# Patient Record
Sex: FEMALE | Race: WHITE | Employment: OTHER | URBAN - NONMETROPOLITAN AREA
[De-identification: names, ages, dates, MRNs, and addresses within clinical notes are randomized per-mention and may not be internally consistent; named-entity substitution may affect disease eponyms.]

---

## 2018-01-06 PROBLEM — C50.211: Status: ACTIVE | Noted: 2018-01-06

## 2018-01-06 NOTE — PROGRESS NOTES
Paula Veliz. Richi Patton MD [facility]  General Surgery  Postprocedure Evaluation in Office  Pt Name: Tomeka Hernandez  Date of Birth 1950   Today's Date: 1/8/2018  Medical Record Number: [id number]  Referring Provider: No ref. provider found  Primary Care Provider: Pito Parekh MD  Chief Complaint   Patient presents with   [facility]     established pt--right breast cancer     ASSESSMENT      Problem List Items Addressed This Visit     Personal history of breast cancer    History of left mastectomy - Primary    Relevant Orders    External Referral To Plastic Surgery    Carcinoma of upper-inner quadrant of female breast, right [facility])    Relevant Orders    External Referral To Plastic Surgery    MASTECTOMY COMPLETE / SIMPLE    LYMPHANGIOGRAPHY INJECTION FOR SENTINEL NODE IDENTIFICATION    BIOPSY / EXCISION SENTINEL NODE DEEP AXILLARY      Other Visit Diagnoses    None. Carcinoma of upper-inner quadrant of female breast, right [facility])    Staging form: Breast, AJCC 8th Edition    - Clinical stage from 1/6/2018: Stage IA (cT1b, cN0, cM0, G3, ER: Positive, VA: Positive, HER2: Negative) - Signed by Mateo Almonte MD on 1/6/2018       PLANS       There are no Patient Instructions on file for this visit. 1. Follow up: No Follow-up on file.   2. Will see Dr Tyesha Donaldson after surgery  Orders Placed This Encounter:  Orders Placed This Encounter   Procedures    MASTECTOMY COMPLETE / SIMPLE     Standing Status:   Future     Standing Expiration Date:   1/8/2019     Order Specific Question:   Pre-procedure Diagnosis     Answer:   RIGHT BREAST CANCER    LYMPHANGIOGRAPHY INJECTION FOR SENTINEL NODE IDENTIFICATION     Standing Status:   Future     Standing Expiration Date:   1/8/2019     Order Specific Question:   Pre-procedure Diagnosis     Answer:   Breast Cancer     Order Specific Question:   Special needs     Answer:   Dr Richi Patton to Perform    BIOPSY / EXCISION SENTINEL NODE DEEP AXILLARY     Standing Status:   Future Procedure Laterality Date    APPENDECTOMY  as a child    3years old    BREAST BIOPSY Right 12/28/2017    [address]  [address]    Dr Janes Abreu  2006    reconstruction breast -Dr Jim Toussaint Right 2005    Dr. Rico Fritz Left 2006    Dr Lindsay Trammell     Medications  Current Outpatient Prescriptions on File Prior to Visit   Medication Sig Dispense Refill    venlafaxine (EFFEXOR) 75 MG tablet Take 75 mg by mouth daily      levothyroxine (SYNTHROID) 75 MCG tablet Take 75 mcg by mouth Daily       No current facility-administered medications on file prior to visit. Allergies  No Known Allergies  Social History  Social History     Social History    Marital status:      Spouse name: N/A    Number of children: 2    Years of education: N/A     Occupational History    Not on file. Social History Main Topics    Smoking status: Never Smoker    Smokeless tobacco: Never Used    Alcohol use 0.0 oz/week      Comment: social    Drug use: No    Sexual activity: Not on file     Other Topics Concern    Not on file     Social History Narrative    No narrative on file     [address] Maintenance   Topic Date Due    Hepatitis C screen  1950    DTaP/Tdap/Td vaccine (1 - Tdap) 08/10/1967    Lipid screen  08/10/1988    Diabetes screen  08/10/1988    Colon cancer screen colonoscopy  08/10/1998    Zostavax vaccine  08/10/2008    DEXA (modify frequency per FRAX score)  08/10/2013    Pneumococcal low/med risk (1 of 2 - PCV13) 08/10/2013    Flu vaccine (1) 09/01/2017    Breast cancer screen  12/08/2017     Review of Systems  History obtained from the patient. Constitutional: Negative for fever, chills, diaphoresis, activity change, appetite change, fatigue and unexpected weight change.    HENT: Negative for congestion, dental problem, drooling, ear discharge, ear pain, facial swelling, hearing loss, breast has a left mastectomy with reconstruction in place. No sign recurrence. . Right breast is examined in the upright and supine position  . The right breast has a circumareolar and inframammary scar from previous breast reduction. There is a biopsy site located at the 3:00 position near the nipple areolar complex with some ecchymosis of the skin more medial to this. Appreciate any mass in this area. Axilla is clinically negative. Brittanie Gonzalez Physical Examination: Eyes - pupils equal and reactive, extraocular eye movements intact  Ears - bilateral TM's and external ear canals normal  Nose - normal and patent, no erythema, discharge or polyps  Mouth - mucous membranes moist, pharynx normal without lesions  Chest - clear to auscultation, no wheezes, rales or rhonchi, symmetric air entry  Heart - normal rate, regular rhythm, normal S1, S2, no murmurs, rubs, clicks or gallops  Abdomen - soft, nontender, nondistended, no masses or organomegaly  Rectal -deferred  Extremities - peripheral pulses normal, no pedal edema, no clubbing or cyanosis             Vanna Sullivan MD [facility]  1/8/2018   The patients records and films were reviewed independently. Time was given for questions . All questions were answered to the patients satisfaction. A total time of 60 minutes  was spent with at least 51% related to counseling and coordination of care.

## 2018-01-08 ENCOUNTER — OFFICE VISIT (OUTPATIENT)
Dept: SURGERY | Age: 68
End: 2018-01-08
Payer: MEDICARE

## 2018-01-08 ENCOUNTER — TELEPHONE (OUTPATIENT)
Dept: SURGERY | Age: 68
End: 2018-01-08

## 2018-01-08 VITALS
DIASTOLIC BLOOD PRESSURE: 62 MMHG | TEMPERATURE: 97 F | SYSTOLIC BLOOD PRESSURE: 130 MMHG | BODY MASS INDEX: 28.44 KG/M2 | RESPIRATION RATE: 18 BRPM | HEART RATE: 90 BPM | HEIGHT: 63 IN | OXYGEN SATURATION: 97 % | WEIGHT: 160.5 LBS

## 2018-01-08 DIAGNOSIS — Z90.12 HISTORY OF LEFT MASTECTOMY: Primary | ICD-10-CM

## 2018-01-08 DIAGNOSIS — C50.211 CARCINOMA OF UPPER-INNER QUADRANT OF FEMALE BREAST, RIGHT (HCC): ICD-10-CM

## 2018-01-08 DIAGNOSIS — Z85.3 PERSONAL HISTORY OF BREAST CANCER: ICD-10-CM

## 2018-01-08 PROCEDURE — G8400 PT W/DXA NO RESULTS DOC: HCPCS | Performed by: SURGERY

## 2018-01-08 PROCEDURE — 1123F ACP DISCUSS/DSCN MKR DOCD: CPT | Performed by: SURGERY

## 2018-01-08 PROCEDURE — G8427 DOCREV CUR MEDS BY ELIG CLIN: HCPCS | Performed by: SURGERY

## 2018-01-08 PROCEDURE — 1090F PRES/ABSN URINE INCON ASSESS: CPT | Performed by: SURGERY

## 2018-01-08 PROCEDURE — G8484 FLU IMMUNIZE NO ADMIN: HCPCS | Performed by: SURGERY

## 2018-01-08 PROCEDURE — 99215 OFFICE O/P EST HI 40 MIN: CPT | Performed by: SURGERY

## 2018-01-08 PROCEDURE — 4040F PNEUMOC VAC/ADMIN/RCVD: CPT | Performed by: SURGERY

## 2018-01-08 PROCEDURE — G8419 CALC BMI OUT NRM PARAM NOF/U: HCPCS | Performed by: SURGERY

## 2018-01-08 PROCEDURE — 3014F SCREEN MAMMO DOC REV: CPT | Performed by: SURGERY

## 2018-01-08 PROCEDURE — 3017F COLORECTAL CA SCREEN DOC REV: CPT | Performed by: SURGERY

## 2018-01-08 PROCEDURE — 1036F TOBACCO NON-USER: CPT | Performed by: SURGERY

## 2018-01-08 ASSESSMENT — ENCOUNTER SYMPTOMS
CHOKING: 0
ABDOMINAL PAIN: 0
CONSTIPATION: 0
EYE DISCHARGE: 0
COLOR CHANGE: 0
CHEST TIGHTNESS: 0
DIARRHEA: 0
STRIDOR: 0
VOICE CHANGE: 0
BACK PAIN: 0
APNEA: 0
COUGH: 0
VOMITING: 0
BLOOD IN STOOL: 0
FACIAL SWELLING: 0
WHEEZING: 0
SINUS PAIN: 0
EYE REDNESS: 0
EYE ITCHING: 0
ANAL BLEEDING: 0
SHORTNESS OF BREATH: 0
ABDOMINAL DISTENTION: 0
RHINORRHEA: 0
NAUSEA: 0
RECTAL PAIN: 0
SORE THROAT: 0
TROUBLE SWALLOWING: 0
SINUS PRESSURE: 0
PHOTOPHOBIA: 0
EYE PAIN: 0

## 2018-01-11 ENCOUNTER — TELEPHONE (OUTPATIENT)
Dept: SURGERY | Age: 68
End: 2018-01-11

## 2018-01-18 RX ORDER — UBIDECARENONE 30 MG
1 CAPSULE ORAL DAILY
COMMUNITY

## 2018-01-18 RX ORDER — CHOLECALCIFEROL (VITAMIN D3) 125 MCG
1 CAPSULE ORAL DAILY
COMMUNITY

## 2018-01-25 ENCOUNTER — ANESTHESIA (OUTPATIENT)
Dept: OPERATING ROOM | Age: 68
End: 2018-01-25
Payer: MEDICARE

## 2018-01-25 ENCOUNTER — HOSPITAL ENCOUNTER (OUTPATIENT)
Dept: NUCLEAR MEDICINE | Age: 68
Discharge: HOME | End: 2018-01-25
Payer: MEDICARE

## 2018-01-25 ENCOUNTER — ANESTHESIA EVENT (OUTPATIENT)
Dept: OPERATING ROOM | Age: 68
End: 2018-01-25
Payer: MEDICARE

## 2018-01-25 ENCOUNTER — HOSPITAL ENCOUNTER (OUTPATIENT)
Age: 68
Discharge: HOME | End: 2018-01-27
Attending: SURGERY | Admitting: SURGERY
Payer: MEDICARE

## 2018-01-25 VITALS — OXYGEN SATURATION: 97 % | DIASTOLIC BLOOD PRESSURE: 78 MMHG | SYSTOLIC BLOOD PRESSURE: 149 MMHG | TEMPERATURE: 97.2 F

## 2018-01-25 DIAGNOSIS — Z90.11 S/P MASTECTOMY, RIGHT: Primary | ICD-10-CM

## 2018-01-25 DIAGNOSIS — C50.211 CARCINOMA OF UPPER-INNER QUADRANT OF FEMALE BREAST, RIGHT (HCC): ICD-10-CM

## 2018-01-25 PROCEDURE — 3600000003 HC SURGERY LEVEL 3 BASE: Performed by: SURGERY

## 2018-01-25 PROCEDURE — A6252 ABSORPT DRG >16 <=48 W/O BDR: HCPCS | Performed by: SURGERY

## 2018-01-25 PROCEDURE — A9541 TC99M SULFUR COLLOID: HCPCS | Performed by: SURGERY

## 2018-01-25 PROCEDURE — 88307 TISSUE EXAM BY PATHOLOGIST: CPT

## 2018-01-25 PROCEDURE — A6223 GAUZE >16<=48 NO W/SAL W/O B: HCPCS | Performed by: SURGERY

## 2018-01-25 PROCEDURE — 3430000000 HC RX DIAGNOSTIC RADIOPHARMACEUTICAL: Performed by: SURGERY

## 2018-01-25 PROCEDURE — 7100000000 HC PACU RECOVERY - FIRST 15 MIN: Performed by: SURGERY

## 2018-01-25 PROCEDURE — 38792 RA TRACER ID OF SENTINL NODE: CPT | Performed by: SURGERY

## 2018-01-25 PROCEDURE — 6360000002 HC RX W HCPCS: Performed by: ANESTHESIOLOGY

## 2018-01-25 PROCEDURE — 3700000000 HC ANESTHESIA ATTENDED CARE: Performed by: SURGERY

## 2018-01-25 PROCEDURE — 38792 RA TRACER ID OF SENTINL NODE: CPT

## 2018-01-25 PROCEDURE — 3600000013 HC SURGERY LEVEL 3 ADDTL 15MIN: Performed by: SURGERY

## 2018-01-25 PROCEDURE — 6370000000 HC RX 637 (ALT 250 FOR IP): Performed by: SURGERY

## 2018-01-25 PROCEDURE — 2500000003 HC RX 250 WO HCPCS: Performed by: NURSE ANESTHETIST, CERTIFIED REGISTERED

## 2018-01-25 PROCEDURE — 88342 IMHCHEM/IMCYTCHM 1ST ANTB: CPT

## 2018-01-25 PROCEDURE — 96361 HYDRATE IV INFUSION ADD-ON: CPT

## 2018-01-25 PROCEDURE — 6360000002 HC RX W HCPCS: Performed by: NURSE ANESTHETIST, CERTIFIED REGISTERED

## 2018-01-25 PROCEDURE — 88341 IMHCHEM/IMCYTCHM EA ADD ANTB: CPT

## 2018-01-25 PROCEDURE — A4648 IMPLANTABLE TISSUE MARKER: HCPCS | Performed by: SURGERY

## 2018-01-25 PROCEDURE — 7100000010 HC PHASE II RECOVERY - FIRST 15 MIN: Performed by: SURGERY

## 2018-01-25 PROCEDURE — 96360 HYDRATION IV INFUSION INIT: CPT

## 2018-01-25 PROCEDURE — 19303 MAST SIMPLE COMPLETE: CPT | Performed by: SURGERY

## 2018-01-25 PROCEDURE — 6360000002 HC RX W HCPCS: Performed by: SURGERY

## 2018-01-25 PROCEDURE — 2580000003 HC RX 258: Performed by: SURGERY

## 2018-01-25 PROCEDURE — 38525 BIOPSY/REMOVAL LYMPH NODES: CPT | Performed by: SURGERY

## 2018-01-25 PROCEDURE — 88304 TISSUE EXAM BY PATHOLOGIST: CPT

## 2018-01-25 PROCEDURE — 7100000011 HC PHASE II RECOVERY - ADDTL 15 MIN: Performed by: SURGERY

## 2018-01-25 PROCEDURE — A6446 CONFORM BAND S W>=3" <5"/YD: HCPCS | Performed by: SURGERY

## 2018-01-25 PROCEDURE — 2780000010 HC IMPLANT OTHER: Performed by: SURGERY

## 2018-01-25 PROCEDURE — 7100000001 HC PACU RECOVERY - ADDTL 15 MIN: Performed by: SURGERY

## 2018-01-25 PROCEDURE — 3700000001 HC ADD 15 MINUTES (ANESTHESIA): Performed by: SURGERY

## 2018-01-25 RX ORDER — VENLAFAXINE 37.5 MG/1
75 TABLET ORAL DAILY
Status: DISCONTINUED | OUTPATIENT
Start: 2018-01-25 | End: 2018-01-27 | Stop reason: HOSPADM

## 2018-01-25 RX ORDER — ONDANSETRON 2 MG/ML
4 INJECTION INTRAMUSCULAR; INTRAVENOUS
Status: DISCONTINUED | OUTPATIENT
Start: 2018-01-25 | End: 2018-01-25 | Stop reason: HOSPADM

## 2018-01-25 RX ORDER — SUCCINYLCHOLINE CHLORIDE 20 MG/ML
INJECTION INTRAMUSCULAR; INTRAVENOUS PRN
Status: DISCONTINUED | OUTPATIENT
Start: 2018-01-25 | End: 2018-01-25 | Stop reason: SDUPTHER

## 2018-01-25 RX ORDER — PROPOFOL 10 MG/ML
INJECTION, EMULSION INTRAVENOUS PRN
Status: DISCONTINUED | OUTPATIENT
Start: 2018-01-25 | End: 2018-01-25 | Stop reason: SDUPTHER

## 2018-01-25 RX ORDER — DIPHENHYDRAMINE HYDROCHLORIDE 50 MG/ML
12.5 INJECTION INTRAMUSCULAR; INTRAVENOUS
Status: DISCONTINUED | OUTPATIENT
Start: 2018-01-25 | End: 2018-01-25 | Stop reason: HOSPADM

## 2018-01-25 RX ORDER — HYDROMORPHONE HCL 110MG/55ML
PATIENT CONTROLLED ANALGESIA SYRINGE INTRAVENOUS PRN
Status: DISCONTINUED | OUTPATIENT
Start: 2018-01-25 | End: 2018-01-25 | Stop reason: SDUPTHER

## 2018-01-25 RX ORDER — SODIUM CHLORIDE 0.9 % (FLUSH) 0.9 %
10 SYRINGE (ML) INJECTION EVERY 12 HOURS SCHEDULED
Status: DISCONTINUED | OUTPATIENT
Start: 2018-01-25 | End: 2018-01-25 | Stop reason: HOSPADM

## 2018-01-25 RX ORDER — ACETAMINOPHEN 325 MG/1
650 TABLET ORAL EVERY 4 HOURS PRN
Status: DISCONTINUED | OUTPATIENT
Start: 2018-01-25 | End: 2018-01-27 | Stop reason: HOSPADM

## 2018-01-25 RX ORDER — MORPHINE SULFATE 2 MG/ML
2 INJECTION, SOLUTION INTRAMUSCULAR; INTRAVENOUS
Status: ACTIVE | OUTPATIENT
Start: 2018-01-25 | End: 2018-01-26

## 2018-01-25 RX ORDER — HYDROCODONE BITARTRATE AND ACETAMINOPHEN 5; 325 MG/1; MG/1
1 TABLET ORAL EVERY 4 HOURS PRN
Status: DISCONTINUED | OUTPATIENT
Start: 2018-01-25 | End: 2018-01-27 | Stop reason: HOSPADM

## 2018-01-25 RX ORDER — HYDROCODONE BITARTRATE AND ACETAMINOPHEN 5; 325 MG/1; MG/1
2 TABLET ORAL EVERY 4 HOURS PRN
Status: DISCONTINUED | OUTPATIENT
Start: 2018-01-25 | End: 2018-01-27 | Stop reason: HOSPADM

## 2018-01-25 RX ORDER — ONDANSETRON 2 MG/ML
INJECTION INTRAMUSCULAR; INTRAVENOUS PRN
Status: DISCONTINUED | OUTPATIENT
Start: 2018-01-25 | End: 2018-01-25 | Stop reason: SDUPTHER

## 2018-01-25 RX ORDER — MORPHINE SULFATE 2 MG/ML
2 INJECTION, SOLUTION INTRAMUSCULAR; INTRAVENOUS EVERY 5 MIN PRN
Status: DISCONTINUED | OUTPATIENT
Start: 2018-01-25 | End: 2018-01-25 | Stop reason: HOSPADM

## 2018-01-25 RX ORDER — MIDAZOLAM HYDROCHLORIDE 1 MG/ML
INJECTION INTRAMUSCULAR; INTRAVENOUS PRN
Status: DISCONTINUED | OUTPATIENT
Start: 2018-01-25 | End: 2018-01-25 | Stop reason: SDUPTHER

## 2018-01-25 RX ORDER — LIDOCAINE HYDROCHLORIDE 20 MG/ML
INJECTION, SOLUTION INFILTRATION; PERINEURAL PRN
Status: DISCONTINUED | OUTPATIENT
Start: 2018-01-25 | End: 2018-01-25 | Stop reason: SDUPTHER

## 2018-01-25 RX ORDER — HYDRALAZINE HYDROCHLORIDE 20 MG/ML
5 INJECTION INTRAMUSCULAR; INTRAVENOUS EVERY 10 MIN PRN
Status: DISCONTINUED | OUTPATIENT
Start: 2018-01-25 | End: 2018-01-25 | Stop reason: HOSPADM

## 2018-01-25 RX ORDER — SODIUM CHLORIDE 0.9 % (FLUSH) 0.9 %
10 SYRINGE (ML) INJECTION EVERY 12 HOURS SCHEDULED
Status: DISCONTINUED | OUTPATIENT
Start: 2018-01-25 | End: 2018-01-27 | Stop reason: HOSPADM

## 2018-01-25 RX ORDER — SODIUM CHLORIDE 0.9 % (FLUSH) 0.9 %
10 SYRINGE (ML) INJECTION PRN
Status: DISCONTINUED | OUTPATIENT
Start: 2018-01-25 | End: 2018-01-27 | Stop reason: HOSPADM

## 2018-01-25 RX ORDER — FENTANYL CITRATE 50 UG/ML
INJECTION, SOLUTION INTRAMUSCULAR; INTRAVENOUS PRN
Status: DISCONTINUED | OUTPATIENT
Start: 2018-01-25 | End: 2018-01-25 | Stop reason: SDUPTHER

## 2018-01-25 RX ORDER — SODIUM CHLORIDE, SODIUM LACTATE, POTASSIUM CHLORIDE, CALCIUM CHLORIDE 600; 310; 30; 20 MG/100ML; MG/100ML; MG/100ML; MG/100ML
INJECTION, SOLUTION INTRAVENOUS CONTINUOUS
Status: DISCONTINUED | OUTPATIENT
Start: 2018-01-25 | End: 2018-01-27 | Stop reason: HOSPADM

## 2018-01-25 RX ORDER — MORPHINE SULFATE 2 MG/ML
4 INJECTION, SOLUTION INTRAMUSCULAR; INTRAVENOUS
Status: ACTIVE | OUTPATIENT
Start: 2018-01-25 | End: 2018-01-26

## 2018-01-25 RX ORDER — ONDANSETRON 2 MG/ML
4 INJECTION INTRAMUSCULAR; INTRAVENOUS EVERY 6 HOURS PRN
Status: DISCONTINUED | OUTPATIENT
Start: 2018-01-25 | End: 2018-01-27 | Stop reason: HOSPADM

## 2018-01-25 RX ORDER — SODIUM CHLORIDE 0.9 % (FLUSH) 0.9 %
10 SYRINGE (ML) INJECTION PRN
Status: DISCONTINUED | OUTPATIENT
Start: 2018-01-25 | End: 2018-01-25 | Stop reason: HOSPADM

## 2018-01-25 RX ORDER — LEVOTHYROXINE SODIUM 0.07 MG/1
75 TABLET ORAL DAILY
Status: DISCONTINUED | OUTPATIENT
Start: 2018-01-25 | End: 2018-01-27 | Stop reason: HOSPADM

## 2018-01-25 RX ORDER — SODIUM CHLORIDE 9 MG/ML
INJECTION, SOLUTION INTRAVENOUS CONTINUOUS
Status: DISCONTINUED | OUTPATIENT
Start: 2018-01-25 | End: 2018-01-25

## 2018-01-25 RX ORDER — DEXAMETHASONE SODIUM PHOSPHATE 4 MG/ML
INJECTION, SOLUTION INTRA-ARTICULAR; INTRALESIONAL; INTRAMUSCULAR; INTRAVENOUS; SOFT TISSUE PRN
Status: DISCONTINUED | OUTPATIENT
Start: 2018-01-25 | End: 2018-01-25 | Stop reason: SDUPTHER

## 2018-01-25 RX ORDER — LABETALOL HYDROCHLORIDE 5 MG/ML
5 INJECTION, SOLUTION INTRAVENOUS EVERY 5 MIN PRN
Status: DISCONTINUED | OUTPATIENT
Start: 2018-01-25 | End: 2018-01-25 | Stop reason: HOSPADM

## 2018-01-25 RX ORDER — FENTANYL CITRATE 50 UG/ML
50 INJECTION, SOLUTION INTRAMUSCULAR; INTRAVENOUS EVERY 5 MIN PRN
Status: DISCONTINUED | OUTPATIENT
Start: 2018-01-25 | End: 2018-01-25 | Stop reason: HOSPADM

## 2018-01-25 RX ORDER — MEPERIDINE HYDROCHLORIDE 25 MG/ML
12.5 INJECTION INTRAMUSCULAR; INTRAVENOUS; SUBCUTANEOUS EVERY 5 MIN PRN
Status: DISCONTINUED | OUTPATIENT
Start: 2018-01-25 | End: 2018-01-25 | Stop reason: HOSPADM

## 2018-01-25 RX ADMIN — PHENYLEPHRINE HYDROCHLORIDE 100 MCG: 10 INJECTION INTRAMUSCULAR; INTRAVENOUS; SUBCUTANEOUS at 13:07

## 2018-01-25 RX ADMIN — FENTANYL CITRATE 50 MCG: 50 INJECTION INTRAMUSCULAR; INTRAVENOUS at 14:51

## 2018-01-25 RX ADMIN — CEFAZOLIN SODIUM 2 G: 2 SOLUTION INTRAVENOUS at 20:12

## 2018-01-25 RX ADMIN — Medication 1.1 MILLICURIE: at 10:35

## 2018-01-25 RX ADMIN — DEXAMETHASONE SODIUM PHOSPHATE 10 MG: 4 INJECTION, SOLUTION INTRAMUSCULAR; INTRAVENOUS at 12:44

## 2018-01-25 RX ADMIN — SUCCINYLCHOLINE CHLORIDE 120 MG: 20 INJECTION, SOLUTION INTRAMUSCULAR; INTRAVENOUS at 12:39

## 2018-01-25 RX ADMIN — PHENYLEPHRINE HYDROCHLORIDE 150 MCG: 10 INJECTION INTRAMUSCULAR; INTRAVENOUS; SUBCUTANEOUS at 13:41

## 2018-01-25 RX ADMIN — HYDROMORPHONE HYDROCHLORIDE 0.5 MG: 2 INJECTION INTRAMUSCULAR; INTRAVENOUS; SUBCUTANEOUS at 14:33

## 2018-01-25 RX ADMIN — HYDROCODONE BITARTRATE AND ACETAMINOPHEN 2 TABLET: 5; 325 TABLET ORAL at 20:24

## 2018-01-25 RX ADMIN — HYDROMORPHONE HYDROCHLORIDE 0.25 MG: 2 INJECTION INTRAMUSCULAR; INTRAVENOUS; SUBCUTANEOUS at 14:02

## 2018-01-25 RX ADMIN — PHENYLEPHRINE HYDROCHLORIDE 150 MCG: 10 INJECTION INTRAMUSCULAR; INTRAVENOUS; SUBCUTANEOUS at 13:20

## 2018-01-25 RX ADMIN — LIDOCAINE HYDROCHLORIDE 80 MG: 20 INJECTION, SOLUTION INFILTRATION; PERINEURAL at 12:36

## 2018-01-25 RX ADMIN — MIDAZOLAM HYDROCHLORIDE 2 MG: 1 INJECTION, SOLUTION INTRAMUSCULAR; INTRAVENOUS at 12:32

## 2018-01-25 RX ADMIN — FENTANYL CITRATE 50 MCG: 50 INJECTION INTRAMUSCULAR; INTRAVENOUS at 15:02

## 2018-01-25 RX ADMIN — SODIUM CHLORIDE: 9 INJECTION, SOLUTION INTRAVENOUS at 14:20

## 2018-01-25 RX ADMIN — HYDROMORPHONE HYDROCHLORIDE 0.25 MG: 2 INJECTION INTRAMUSCULAR; INTRAVENOUS; SUBCUTANEOUS at 14:40

## 2018-01-25 RX ADMIN — FENTANYL CITRATE 100 MCG: 50 INJECTION INTRAMUSCULAR; INTRAVENOUS at 12:36

## 2018-01-25 RX ADMIN — ONDANSETRON 4 MG: 2 INJECTION INTRAMUSCULAR; INTRAVENOUS at 12:44

## 2018-01-25 RX ADMIN — PHENYLEPHRINE HYDROCHLORIDE 100 MCG: 10 INJECTION INTRAMUSCULAR; INTRAVENOUS; SUBCUTANEOUS at 13:18

## 2018-01-25 RX ADMIN — PHENYLEPHRINE HYDROCHLORIDE 100 MCG: 10 INJECTION INTRAMUSCULAR; INTRAVENOUS; SUBCUTANEOUS at 13:30

## 2018-01-25 RX ADMIN — SODIUM CHLORIDE: 9 INJECTION, SOLUTION INTRAVENOUS at 11:21

## 2018-01-25 RX ADMIN — HYDROCODONE BITARTRATE AND ACETAMINOPHEN 2 TABLET: 5; 325 TABLET ORAL at 16:21

## 2018-01-25 RX ADMIN — PROPOFOL 150 MG: 10 INJECTION, EMULSION INTRAVENOUS at 12:39

## 2018-01-25 RX ADMIN — PHENYLEPHRINE HYDROCHLORIDE 100 MCG: 10 INJECTION INTRAMUSCULAR; INTRAVENOUS; SUBCUTANEOUS at 13:05

## 2018-01-25 RX ADMIN — FENTANYL CITRATE 50 MCG: 50 INJECTION INTRAMUSCULAR; INTRAVENOUS at 14:46

## 2018-01-25 RX ADMIN — CEFAZOLIN SODIUM 2 G: 2 SOLUTION INTRAVENOUS at 12:44

## 2018-01-25 ASSESSMENT — PULMONARY FUNCTION TESTS
PIF_VALUE: 16
PIF_VALUE: 21
PIF_VALUE: 22
PIF_VALUE: 2
PIF_VALUE: 23
PIF_VALUE: 2
PIF_VALUE: 20
PIF_VALUE: 19
PIF_VALUE: 21
PIF_VALUE: 21
PIF_VALUE: 20
PIF_VALUE: 22
PIF_VALUE: 20
PIF_VALUE: 21
PIF_VALUE: 20
PIF_VALUE: 20
PIF_VALUE: 18
PIF_VALUE: 21
PIF_VALUE: 2
PIF_VALUE: 21
PIF_VALUE: 19
PIF_VALUE: 14
PIF_VALUE: 2
PIF_VALUE: 21
PIF_VALUE: 22
PIF_VALUE: 23
PIF_VALUE: 21
PIF_VALUE: 23
PIF_VALUE: 15
PIF_VALUE: 20
PIF_VALUE: 19
PIF_VALUE: 3
PIF_VALUE: 22
PIF_VALUE: 24
PIF_VALUE: 22
PIF_VALUE: 19
PIF_VALUE: 1
PIF_VALUE: 20
PIF_VALUE: 21
PIF_VALUE: 2
PIF_VALUE: 21
PIF_VALUE: 20
PIF_VALUE: 23
PIF_VALUE: 2
PIF_VALUE: 20
PIF_VALUE: 20
PIF_VALUE: 13
PIF_VALUE: 21
PIF_VALUE: 21
PIF_VALUE: 22
PIF_VALUE: 21
PIF_VALUE: 20
PIF_VALUE: 20
PIF_VALUE: 2
PIF_VALUE: 21
PIF_VALUE: 20
PIF_VALUE: 20
PIF_VALUE: 2
PIF_VALUE: 20
PIF_VALUE: 2
PIF_VALUE: 21
PIF_VALUE: 20
PIF_VALUE: 0
PIF_VALUE: 20
PIF_VALUE: 19
PIF_VALUE: 20
PIF_VALUE: 22
PIF_VALUE: 12
PIF_VALUE: 15
PIF_VALUE: 22
PIF_VALUE: 20
PIF_VALUE: 2
PIF_VALUE: 20
PIF_VALUE: 2
PIF_VALUE: 21
PIF_VALUE: 20
PIF_VALUE: 21
PIF_VALUE: 1
PIF_VALUE: 21
PIF_VALUE: 0
PIF_VALUE: 2
PIF_VALUE: 20
PIF_VALUE: 20
PIF_VALUE: 21
PIF_VALUE: 1
PIF_VALUE: 22
PIF_VALUE: 20
PIF_VALUE: 20
PIF_VALUE: 2
PIF_VALUE: 21
PIF_VALUE: 22
PIF_VALUE: 22
PIF_VALUE: 8
PIF_VALUE: 21
PIF_VALUE: 22
PIF_VALUE: 19
PIF_VALUE: 31
PIF_VALUE: 22
PIF_VALUE: 1
PIF_VALUE: 22
PIF_VALUE: 28
PIF_VALUE: 21
PIF_VALUE: 20
PIF_VALUE: 2
PIF_VALUE: 3
PIF_VALUE: 20
PIF_VALUE: 2
PIF_VALUE: 20
PIF_VALUE: 7
PIF_VALUE: 21
PIF_VALUE: 20
PIF_VALUE: 20
PIF_VALUE: 23
PIF_VALUE: 22
PIF_VALUE: 20
PIF_VALUE: 13
PIF_VALUE: 20
PIF_VALUE: 22
PIF_VALUE: 2

## 2018-01-25 ASSESSMENT — PAIN SCALES - GENERAL
PAINLEVEL_OUTOF10: 7
PAINLEVEL_OUTOF10: 7
PAINLEVEL_OUTOF10: 8
PAINLEVEL_OUTOF10: 4
PAINLEVEL_OUTOF10: 8
PAINLEVEL_OUTOF10: 7
PAINLEVEL_OUTOF10: 8

## 2018-01-25 ASSESSMENT — PAIN DESCRIPTION - PAIN TYPE: TYPE: SURGICAL PAIN

## 2018-01-25 ASSESSMENT — PAIN DESCRIPTION - LOCATION: LOCATION: BREAST

## 2018-01-25 ASSESSMENT — PAIN - FUNCTIONAL ASSESSMENT: PAIN_FUNCTIONAL_ASSESSMENT: 0-10

## 2018-01-25 ASSESSMENT — PAIN DESCRIPTION - DESCRIPTORS: DESCRIPTORS: ACHING

## 2018-01-25 ASSESSMENT — PAIN DESCRIPTION - ORIENTATION: ORIENTATION: RIGHT;LEFT

## 2018-01-25 ASSESSMENT — PAIN DESCRIPTION - FREQUENCY: FREQUENCY: CONTINUOUS

## 2018-01-25 ASSESSMENT — PAIN DESCRIPTION - ONSET: ONSET: ON-GOING

## 2018-01-25 NOTE — OP NOTE
[facility]  Operative Report    PATIENT NAME: Gwyn Orr  MEDICAL RECORD NO. [id number]  SURGEON: Joyce Kimble MD, FACS  Primary Care Physician: Meenu Branham MD  Date: 1/25/2018, 1:34 PM     PROCEDURE PERFORMED: Right Simple Mastectomy and SLN Bx  PREOPERATIVE DIAGNOSIS: Right  infiltrating ductal carcinoma breast cancer  POSTOPERATIVE DIAGNOSIS: Same, path pending  SURGEON:  Joyce Kimble MD, FACS  ANESTHESIA:  General endotracheal anesthesia  ESTIMATED BLOOD LOSS:  10  ml  SPECIMEN:  Right Breast and sln(s) x2  COMPLICATIONS:  None; patient tolerated the procedure well. DRAINS: (1) 23 Barron drain(s) in the upper flap  DISPOSITION: PACU - hemodynamically stable. CONDITION: stable  Stage: 1A  Findings: 2 sln(s) with 15 cps      Procedure Details   The patient was seen again in the Holding Area. The risks, benefits, indications, potential complications, treatment options, and expected outcomes had been previously discussed with the patient. The possibilities of reaction to medication, pulmonary aspiration, bleeding, recurrent infection, the need for additional procedures, failure to diagnose a condition, and creating a complication requiring transfusion or further operation were discussed with the patient. The patient and/or family concurred with the proposed plan, giving informed consent. The site of surgery was properly noted/marked. The patient was taken to the Operating Room, identified as Gwyn Orr and the procedure verified as Right simple mastectomy and sln bx. A Time Out was held and the above information confirmed. Preoperative antibiotics were given, signed consent in the chart, sequential compression devices were in place, and general anesthesia was administered. Previous radioactive tracer injection for sentinel lymph  node identification was already performed .  The Right breast was identified, and the breast and axilla was prepped and draped sterilely with and the edges tailored as needed. Reapproximation was then completed with skin clips. Adaptic dressing, followed by sterile dressing was then placed at the end of the procedure, sponge and needle counts correct. Patient, tolerated the procedure well. Dr Mary Sullivan was then to proceed with left implant removal and tailoring of excess skin  Instrument, sponge, and needle counts were correct at closure and at the conclusion of the case.          Joyce Kimble MD FACS  Electronically signed 1/25/2018 at 1:34 PM    .

## 2018-01-25 NOTE — H&P
[address] [address]  History and Physical Update    Pt Name: Pantera Flowers  MRN: [id number]  YOB: 1950  Date of evaluation: 1/25/2018    [x] I have examined the patient and reviewed the H&P/Consult and there are no changes to the patient or plans.     [] I have examined the patient and reviewed the H&P/Consult and have noted the following changes:        Gracie Plascencia  Electronically signed 1/25/2018 at 4:37 PM

## 2018-01-25 NOTE — ANESTHESIA POSTPROCEDURE EVALUATION
Department of Anesthesiology  Postprocedure Note    Patient: Chika Cowan  MRN: [id number]  YOB: 1950  Date of evaluation: 1/25/2018  Time:  3:48 PM     Procedure Summary     Date:  01/25/18 Room / Location:  [place] [facility]  / [place] [facility]    Anesthesia Start:  1234 Anesthesia Stop:  1440    Procedures:       RIGHT BREAST SIMPLE MASTECTOMY AND SENTINEL LYMPH NODE BIOPSY (Right Breast)      REMOVAL OF LET SIDE IMPLANT (Left ) Diagnosis:  (RIGHT BREAST CANCER, LEFT SIDE PAIN)    Surgeon:  Marjorie Cantor MD; Maia Maldonado MD Responsible Provider:  Yrn Olsen MD    Anesthesia Type:  general ASA Status:  2          Anesthesia Type: general    Jane Phase I: Jane Score: 9    Jane Phase II: Jane Score: 9    Last vitals: Reviewed and per EMR flowsheets.        Anesthesia Post Evaluation    Patient location during evaluation: PACU  Patient participation: complete - patient participated  Level of consciousness: awake and alert  Airway patency: patent  Nausea & Vomiting: no nausea  Complications: no  Cardiovascular status: blood pressure returned to baseline and hemodynamically stable  Respiratory status: acceptable and spontaneous ventilation  Hydration status: euvolemic

## 2018-01-26 ENCOUNTER — ANESTHESIA (OUTPATIENT)
Dept: OPERATING ROOM | Age: 68
End: 2018-01-26
Payer: MEDICARE

## 2018-01-26 ENCOUNTER — ANESTHESIA EVENT (OUTPATIENT)
Dept: OPERATING ROOM | Age: 68
End: 2018-01-26
Payer: MEDICARE

## 2018-01-26 VITALS
RESPIRATION RATE: 11 BRPM | DIASTOLIC BLOOD PRESSURE: 48 MMHG | OXYGEN SATURATION: 100 % | SYSTOLIC BLOOD PRESSURE: 81 MMHG

## 2018-01-26 LAB
CREAT SERPL-MCNC: 0.5 MG/DL (ref 0.4–1.2)
GFR SERPL CREATININE-BSD FRML MDRD: > 90 ML/MIN/1.73M2

## 2018-01-26 PROCEDURE — 3600000012 HC SURGERY LEVEL 2 ADDTL 15MIN: Performed by: SURGERY

## 2018-01-26 PROCEDURE — 3600000002 HC SURGERY LEVEL 2 BASE: Performed by: SURGERY

## 2018-01-26 PROCEDURE — A6222 GAUZE <=16 IN NO W/SAL W/O B: HCPCS | Performed by: SURGERY

## 2018-01-26 PROCEDURE — 7100000001 HC PACU RECOVERY - ADDTL 15 MIN: Performed by: SURGERY

## 2018-01-26 PROCEDURE — 36415 COLL VENOUS BLD VENIPUNCTURE: CPT

## 2018-01-26 PROCEDURE — 2580000003 HC RX 258: Performed by: ANESTHESIOLOGY

## 2018-01-26 PROCEDURE — 96367 TX/PROPH/DG ADDL SEQ IV INF: CPT

## 2018-01-26 PROCEDURE — 96361 HYDRATE IV INFUSION ADD-ON: CPT

## 2018-01-26 PROCEDURE — 3700000001 HC ADD 15 MINUTES (ANESTHESIA): Performed by: SURGERY

## 2018-01-26 PROCEDURE — 2700000000 HC OXYGEN THERAPY PER DAY

## 2018-01-26 PROCEDURE — 3700000000 HC ANESTHESIA ATTENDED CARE: Performed by: SURGERY

## 2018-01-26 PROCEDURE — 96366 THER/PROPH/DIAG IV INF ADDON: CPT

## 2018-01-26 PROCEDURE — 6360000002 HC RX W HCPCS: Performed by: SURGERY

## 2018-01-26 PROCEDURE — 82565 ASSAY OF CREATININE: CPT

## 2018-01-26 PROCEDURE — 6360000002 HC RX W HCPCS: Performed by: ANESTHESIOLOGY

## 2018-01-26 PROCEDURE — 6370000000 HC RX 637 (ALT 250 FOR IP)

## 2018-01-26 PROCEDURE — 2580000003 HC RX 258: Performed by: SURGERY

## 2018-01-26 PROCEDURE — 96368 THER/DIAG CONCURRENT INF: CPT

## 2018-01-26 PROCEDURE — 7100000000 HC PACU RECOVERY - FIRST 15 MIN: Performed by: SURGERY

## 2018-01-26 PROCEDURE — A6252 ABSORPT DRG >16 <=48 W/O BDR: HCPCS

## 2018-01-26 PROCEDURE — 6370000000 HC RX 637 (ALT 250 FOR IP): Performed by: SURGERY

## 2018-01-26 PROCEDURE — 2500000003 HC RX 250 WO HCPCS: Performed by: ANESTHESIOLOGY

## 2018-01-26 PROCEDURE — 99024 POSTOP FOLLOW-UP VISIT: CPT | Performed by: SURGERY

## 2018-01-26 RX ORDER — FENTANYL CITRATE 50 UG/ML
INJECTION, SOLUTION INTRAMUSCULAR; INTRAVENOUS PRN
Status: DISCONTINUED | OUTPATIENT
Start: 2018-01-26 | End: 2018-01-26 | Stop reason: SDUPTHER

## 2018-01-26 RX ORDER — GLYCOPYRROLATE 0.2 MG/ML
INJECTION INTRAMUSCULAR; INTRAVENOUS PRN
Status: DISCONTINUED | OUTPATIENT
Start: 2018-01-26 | End: 2018-01-26 | Stop reason: SDUPTHER

## 2018-01-26 RX ORDER — PROMETHAZINE HYDROCHLORIDE 25 MG/ML
12.5 INJECTION, SOLUTION INTRAMUSCULAR; INTRAVENOUS
Status: DISCONTINUED | OUTPATIENT
Start: 2018-01-26 | End: 2018-01-26 | Stop reason: HOSPADM

## 2018-01-26 RX ORDER — DIPHENHYDRAMINE HYDROCHLORIDE 50 MG/ML
12.5 INJECTION INTRAMUSCULAR; INTRAVENOUS
Status: DISCONTINUED | OUTPATIENT
Start: 2018-01-26 | End: 2018-01-26 | Stop reason: HOSPADM

## 2018-01-26 RX ORDER — PROPOFOL 10 MG/ML
INJECTION, EMULSION INTRAVENOUS PRN
Status: DISCONTINUED | OUTPATIENT
Start: 2018-01-26 | End: 2018-01-26 | Stop reason: SDUPTHER

## 2018-01-26 RX ORDER — IBUPROFEN 200 MG
TABLET ORAL ONCE
Status: COMPLETED | OUTPATIENT
Start: 2018-01-26 | End: 2018-01-26

## 2018-01-26 RX ORDER — MEPERIDINE HYDROCHLORIDE 25 MG/ML
25 INJECTION INTRAMUSCULAR; INTRAVENOUS; SUBCUTANEOUS
Status: DISCONTINUED | OUTPATIENT
Start: 2018-01-26 | End: 2018-01-26 | Stop reason: HOSPADM

## 2018-01-26 RX ORDER — LABETALOL HYDROCHLORIDE 5 MG/ML
5 INJECTION, SOLUTION INTRAVENOUS EVERY 10 MIN PRN
Status: DISCONTINUED | OUTPATIENT
Start: 2018-01-26 | End: 2018-01-26 | Stop reason: HOSPADM

## 2018-01-26 RX ORDER — MIDAZOLAM HYDROCHLORIDE 1 MG/ML
INJECTION INTRAMUSCULAR; INTRAVENOUS PRN
Status: DISCONTINUED | OUTPATIENT
Start: 2018-01-26 | End: 2018-01-26 | Stop reason: SDUPTHER

## 2018-01-26 RX ORDER — ONDANSETRON 2 MG/ML
INJECTION INTRAMUSCULAR; INTRAVENOUS PRN
Status: DISCONTINUED | OUTPATIENT
Start: 2018-01-26 | End: 2018-01-26 | Stop reason: SDUPTHER

## 2018-01-26 RX ORDER — HYDROCODONE BITARTRATE AND ACETAMINOPHEN 5; 325 MG/1; MG/1
1 TABLET ORAL EVERY 4 HOURS PRN
Qty: 42 TABLET | Refills: 0 | Status: SHIPPED | OUTPATIENT
Start: 2018-01-26 | End: 2018-02-02

## 2018-01-26 RX ORDER — DEXAMETHASONE SODIUM PHOSPHATE 4 MG/ML
INJECTION, SOLUTION INTRA-ARTICULAR; INTRALESIONAL; INTRAMUSCULAR; INTRAVENOUS; SOFT TISSUE PRN
Status: DISCONTINUED | OUTPATIENT
Start: 2018-01-26 | End: 2018-01-26 | Stop reason: SDUPTHER

## 2018-01-26 RX ORDER — FENTANYL CITRATE 50 UG/ML
50 INJECTION, SOLUTION INTRAMUSCULAR; INTRAVENOUS EVERY 5 MIN PRN
Status: DISCONTINUED | OUTPATIENT
Start: 2018-01-26 | End: 2018-01-26 | Stop reason: HOSPADM

## 2018-01-26 RX ORDER — LIDOCAINE HYDROCHLORIDE 20 MG/ML
INJECTION, SOLUTION EPIDURAL; INFILTRATION; INTRACAUDAL; PERINEURAL PRN
Status: DISCONTINUED | OUTPATIENT
Start: 2018-01-26 | End: 2018-01-26 | Stop reason: SDUPTHER

## 2018-01-26 RX ORDER — FENTANYL CITRATE 50 UG/ML
25 INJECTION, SOLUTION INTRAMUSCULAR; INTRAVENOUS EVERY 5 MIN PRN
Status: DISCONTINUED | OUTPATIENT
Start: 2018-01-26 | End: 2018-01-26 | Stop reason: HOSPADM

## 2018-01-26 RX ORDER — SODIUM CHLORIDE 9 MG/ML
INJECTION, SOLUTION INTRAVENOUS CONTINUOUS PRN
Status: DISCONTINUED | OUTPATIENT
Start: 2018-01-26 | End: 2018-01-26 | Stop reason: SDUPTHER

## 2018-01-26 RX ADMIN — HYDROCODONE BITARTRATE AND ACETAMINOPHEN 1 TABLET: 5; 325 TABLET ORAL at 12:13

## 2018-01-26 RX ADMIN — LEVOTHYROXINE SODIUM 75 MCG: 75 TABLET ORAL at 09:26

## 2018-01-26 RX ADMIN — PHENYLEPHRINE HYDROCHLORIDE 200 MCG: 10 INJECTION INTRAMUSCULAR; INTRAVENOUS; SUBCUTANEOUS at 06:35

## 2018-01-26 RX ADMIN — Medication 3.5 G: at 16:30

## 2018-01-26 RX ADMIN — SODIUM CHLORIDE, POTASSIUM CHLORIDE, SODIUM LACTATE AND CALCIUM CHLORIDE: 600; 310; 30; 20 INJECTION, SOLUTION INTRAVENOUS at 09:42

## 2018-01-26 RX ADMIN — DEXAMETHASONE SODIUM PHOSPHATE 4 MG: 4 INJECTION, SOLUTION INTRAMUSCULAR; INTRAVENOUS at 06:35

## 2018-01-26 RX ADMIN — MIDAZOLAM 2 MG: 1 INJECTION INTRAMUSCULAR; INTRAVENOUS at 06:25

## 2018-01-26 RX ADMIN — LIDOCAINE HYDROCHLORIDE 80 MG: 20 INJECTION, SOLUTION EPIDURAL; INFILTRATION; INTRACAUDAL; PERINEURAL at 06:30

## 2018-01-26 RX ADMIN — PHENYLEPHRINE HYDROCHLORIDE 200 MCG: 10 INJECTION INTRAMUSCULAR; INTRAVENOUS; SUBCUTANEOUS at 06:45

## 2018-01-26 RX ADMIN — HYDROCODONE BITARTRATE AND ACETAMINOPHEN 2 TABLET: 5; 325 TABLET ORAL at 20:56

## 2018-01-26 RX ADMIN — VENLAFAXINE HYDROCHLORIDE 75 MG: 37.5 TABLET ORAL at 11:30

## 2018-01-26 RX ADMIN — SODIUM CHLORIDE: 9 INJECTION, SOLUTION INTRAVENOUS at 06:25

## 2018-01-26 RX ADMIN — CEFAZOLIN SODIUM 2 G: 2 SOLUTION INTRAVENOUS at 04:03

## 2018-01-26 RX ADMIN — HYDROCODONE BITARTRATE AND ACETAMINOPHEN 2 TABLET: 5; 325 TABLET ORAL at 00:43

## 2018-01-26 RX ADMIN — HYDROCODONE BITARTRATE AND ACETAMINOPHEN 2 TABLET: 5; 325 TABLET ORAL at 16:51

## 2018-01-26 RX ADMIN — GLYCOPYRROLATE 0.2 MG: 0.2 INJECTION, SOLUTION INTRAMUSCULAR; INTRAVENOUS at 06:25

## 2018-01-26 RX ADMIN — PHENYLEPHRINE HYDROCHLORIDE 200 MCG: 10 INJECTION INTRAMUSCULAR; INTRAVENOUS; SUBCUTANEOUS at 06:55

## 2018-01-26 RX ADMIN — PROPOFOL 140 MG: 10 INJECTION, EMULSION INTRAVENOUS at 06:30

## 2018-01-26 RX ADMIN — HYDROCODONE BITARTRATE AND ACETAMINOPHEN 2 TABLET: 5; 325 TABLET ORAL at 05:15

## 2018-01-26 RX ADMIN — ONDANSETRON 4 MG: 2 INJECTION INTRAMUSCULAR; INTRAVENOUS at 06:35

## 2018-01-26 RX ADMIN — FENTANYL CITRATE 100 MCG: 50 INJECTION, SOLUTION INTRAMUSCULAR; INTRAVENOUS at 06:30

## 2018-01-26 ASSESSMENT — PAIN DESCRIPTION - LOCATION
LOCATION: CHEST
LOCATION: BREAST
LOCATION: BREAST
LOCATION: CHEST
LOCATION: BREAST
LOCATION: CHEST
LOCATION: BREAST

## 2018-01-26 ASSESSMENT — PAIN SCALES - GENERAL
PAINLEVEL_OUTOF10: 7
PAINLEVEL_OUTOF10: 3
PAINLEVEL_OUTOF10: 5
PAINLEVEL_OUTOF10: 5
PAINLEVEL_OUTOF10: 4
PAINLEVEL_OUTOF10: 7
PAINLEVEL_OUTOF10: 3
PAINLEVEL_OUTOF10: 3
PAINLEVEL_OUTOF10: 4
PAINLEVEL_OUTOF10: 2
PAINLEVEL_OUTOF10: 7
PAINLEVEL_OUTOF10: 4
PAINLEVEL_OUTOF10: 7
PAINLEVEL_OUTOF10: 4

## 2018-01-26 ASSESSMENT — PULMONARY FUNCTION TESTS
PIF_VALUE: 16
PIF_VALUE: 16
PIF_VALUE: 17
PIF_VALUE: 17
PIF_VALUE: 1
PIF_VALUE: 12
PIF_VALUE: 9
PIF_VALUE: 16
PIF_VALUE: 15
PIF_VALUE: 7
PIF_VALUE: 15
PIF_VALUE: 16
PIF_VALUE: 16
PIF_VALUE: 1
PIF_VALUE: 17
PIF_VALUE: 3
PIF_VALUE: 17
PIF_VALUE: 17
PIF_VALUE: 3
PIF_VALUE: 16
PIF_VALUE: 1
PIF_VALUE: 5
PIF_VALUE: 5
PIF_VALUE: 2
PIF_VALUE: 1
PIF_VALUE: 0
PIF_VALUE: 16
PIF_VALUE: 17
PIF_VALUE: 1
PIF_VALUE: 16
PIF_VALUE: 4
PIF_VALUE: 16
PIF_VALUE: 25
PIF_VALUE: 0
PIF_VALUE: 17
PIF_VALUE: 1
PIF_VALUE: 17
PIF_VALUE: 16

## 2018-01-26 ASSESSMENT — PAIN DESCRIPTION - PAIN TYPE
TYPE: SURGICAL PAIN

## 2018-01-26 ASSESSMENT — PAIN DESCRIPTION - PROGRESSION
CLINICAL_PROGRESSION: GRADUALLY IMPROVING

## 2018-01-26 ASSESSMENT — PAIN DESCRIPTION - ONSET
ONSET: ON-GOING

## 2018-01-26 ASSESSMENT — PAIN DESCRIPTION - DESCRIPTORS
DESCRIPTORS: DISCOMFORT
DESCRIPTORS: DISCOMFORT
DESCRIPTORS: SORE
DESCRIPTORS: DISCOMFORT
DESCRIPTORS: DISCOMFORT
DESCRIPTORS: SORE
DESCRIPTORS: DISCOMFORT
DESCRIPTORS: SORE
DESCRIPTORS: SORE
DESCRIPTORS: ACHING
DESCRIPTORS: ACHING
DESCRIPTORS: DISCOMFORT

## 2018-01-26 ASSESSMENT — PAIN DESCRIPTION - FREQUENCY
FREQUENCY: CONTINUOUS

## 2018-01-26 ASSESSMENT — PAIN DESCRIPTION - ORIENTATION
ORIENTATION: RIGHT
ORIENTATION: RIGHT;LEFT
ORIENTATION: RIGHT;LEFT
ORIENTATION: RIGHT
ORIENTATION: RIGHT
ORIENTATION: RIGHT;LEFT
ORIENTATION: RIGHT

## 2018-01-26 NOTE — ANESTHESIA PRE PROCEDURE
Daily Anjum Modi MD        venlafaxine [facility]) tablet 75 mg  75 mg Oral Daily Anjum Modi MD        lactated ringers infusion   Intravenous Continuous Anjum Modi MD        sodium chloride flush 0.9 % injection 10 mL  10 mL Intravenous 2 times per day Anjum Modi MD        sodium chloride flush 0.9 % injection 10 mL  10 mL Intravenous PRN Anjum Modi MD        acetaminophen (TYLENOL) tablet 650 mg  650 mg Oral Q4H PRN Anjum Modi MD        ondansetron [facility]) injection 4 mg  4 mg Intravenous Q6H PRN Anjum Modi MD        HYDROcodone-acetaminophen [facility]) 5-325 MG per tablet 1 tablet  1 tablet Oral Q4H PRLUKE Modi MD        Or    HYDROcodone-acetaminophen [facility]) 5-325 MG per tablet 2 tablet  2 tablet Oral Q4H PRLUKE Modi MD   2 tablet at 01/26/18 0515    morphine injection 2 mg  2 mg Intravenous Q2H PRN Anjum Modi MD        Or    morphine injection 4 mg  4 mg Intravenous Q2H PRN Anjum Modi MD         Facility-Administered Medications Ordered in Other Encounters   Medication Dose Route Frequency Provider Last Rate Last Dose    midazolam (VERSED) injection    PRLUKE Gregory MD   2 mg at 01/26/18 0625    glycopyrrolate (ROBINUL) injection    PRN Camille Gregory MD   0.2 mg at 01/26/18 1440    fentaNYL (SUBLIMAZE) injection    PRN Camille Gregory MD   100 mcg at 01/26/18 0630    lidocaine PF 2 % injection    PRN Camille Gregory MD   80 mg at 01/26/18 0630    propofol injection    PRN Camille Gregory MD   140 mg at 01/26/18 0630    Dexamethasone Sodium Phosphate injection    PRN Camille Gregory MD   4 mg at 01/26/18 0635    ondansetron (ZOFRAN) injection    PRLUKE Gregory MD   4 mg at 01/26/18 0254    phenylephrine (MANNY-SYNEPHRINE) injection    PRLUKE Gregory MD   200 mcg at 01/26/18 0635    0.9 % sodium chloride infusion    Continuous PRN Camille Gregory MD           Allergies:  No Known Allergies    Problem List:    Patient Active Problem List   Diagnosis Code    Breast pain, right N64.4    Personal history of breast cancer Z85.3    History of left mastectomy Z90.12    Carcinoma of upper-inner quadrant of female breast, right ([facility] [address] C50.211    S/P mastectomy, right Z90.11       Past Medical History:        Diagnosis Date    Breast cancer ([facility] [address]     left and right    Depression     Elevated liver enzymes     no meds    Hypothyroidism        Past Surgical History:        Procedure Laterality Date    APPENDECTOMY  as a child    3years old   [facility] BREAST BIOPSY Right 12/28/2017    [address]  [address]    Dr Rica Dodge SURGERY  2006    reconstruction breast -Dr Sukh Harper Right 2005    Dr. Alexia Ng Left 2006    Dr Sandoval Records History:    Social History   Substance Use Topics    Smoking status: Former Smoker     Packs/day: 0.25     Years: 15.00     Types: Cigarettes     Quit date: 1/1/2005    Smokeless tobacco: Never Used    Alcohol use 0.0 oz/week      Comment: social                                Counseling given: Not Answered      Vital Signs (Current):   Vitals:    01/25/18 1649 01/25/18 1951 01/25/18 2357 01/26/18 0430   BP: 121/62 130/68 (!) 114/57 129/61   Pulse: 93 90 84 86   Resp: 15 16 16 16   Temp: 97.6 °F (36.4 °C) 97.7 °F (36.5 °C) 98.1 °F (36.7 °C) 97.8 °F (36.6 °C)   TempSrc: Oral Oral Oral Oral   SpO2: 96% 97% 95% 94%   Weight:       Height:                                                  BP Readings from Last 3 Encounters:   01/26/18 129/61   01/26/18 (!) 75/46   01/25/18 (!) 149/78       NPO Status: Time of last liquid consumption: 2300                        Time of last solid consumption: 2300                        Date of last liquid consumption: 01/24/18                        Date of last solid food consumption: 01/24/18    BMI:   Wt Readings from Last 3 Encounters:   01/25/18 157 lb 10.1 oz (71.5 kg)   01/08/18 160 lb 8 oz (72.8 kg)   11/30/15 158 lb (71.7 kg)     Body mass index is 27.92 kg/m². CBC: No results found for: WBC, RBC, HGB, HCT, MCV, RDW, PLT    CMP:   Lab Results   Component Value Date    CREATININE 0.5 01/26/2018    LABGLOM >90 01/26/2018       POC Tests: No results for input(s): POCGLU, POCNA, POCK, POCCL, POCBUN, POCHEMO, POCHCT in the last 72 hours. Coags: No results found for: PROTIME, INR, APTT    HCG (If Applicable): No results found for: PREGTESTUR, PREGSERUM, HCG, HCGQUANT     ABGs: No results found for: PHART, PO2ART, YNG1TGC, MPK2RTG, BEART, U7BTDHLY     Type & Screen (If Applicable):  No results found for: LABABO, [address]    Anesthesia Evaluation  Patient summary reviewed and Nursing notes reviewed no history of anesthetic complications:   Airway: Mallampati: II  TM distance: >3 FB   Neck ROM: full  Mouth opening: > = 3 FB Dental:    (+) partials      Pulmonary:Negative Pulmonary ROS                              Cardiovascular:Negative CV ROS                      Neuro/Psych:   Negative Neuro/Psych ROS              GI/Hepatic/Renal:   (+) GERD: well controlled,           Endo/Other:    (+) hypothyroidism::., .                 Abdominal:           Vascular:                                        Anesthesia Plan      general     ASA 2 - emergent       Induction: intravenous. MIPS: Postoperative opioids intended and Prophylactic antiemetics administered. Anesthetic plan and risks discussed with patient. Drea Nesbitt MD   1/26/2018

## 2018-01-27 VITALS
SYSTOLIC BLOOD PRESSURE: 118 MMHG | DIASTOLIC BLOOD PRESSURE: 58 MMHG | HEIGHT: 63 IN | OXYGEN SATURATION: 95 % | BODY MASS INDEX: 27.93 KG/M2 | WEIGHT: 157.63 LBS | HEART RATE: 95 BPM | TEMPERATURE: 98 F | RESPIRATION RATE: 12 BRPM

## 2018-01-27 PROCEDURE — 6370000000 HC RX 637 (ALT 250 FOR IP): Performed by: SURGERY

## 2018-01-27 PROCEDURE — 96361 HYDRATE IV INFUSION ADD-ON: CPT

## 2018-01-27 PROCEDURE — A6252 ABSORPT DRG >16 <=48 W/O BDR: HCPCS

## 2018-01-27 PROCEDURE — 99024 POSTOP FOLLOW-UP VISIT: CPT | Performed by: SURGERY

## 2018-01-27 RX ADMIN — HYDROCODONE BITARTRATE AND ACETAMINOPHEN 1 TABLET: 5; 325 TABLET ORAL at 12:15

## 2018-01-27 RX ADMIN — LEVOTHYROXINE SODIUM 75 MCG: 75 TABLET ORAL at 09:54

## 2018-01-27 RX ADMIN — VENLAFAXINE HYDROCHLORIDE 75 MG: 37.5 TABLET ORAL at 09:53

## 2018-01-27 RX ADMIN — HYDROCODONE BITARTRATE AND ACETAMINOPHEN 2 TABLET: 5; 325 TABLET ORAL at 06:19

## 2018-01-27 ASSESSMENT — PAIN DESCRIPTION - FREQUENCY: FREQUENCY: CONTINUOUS

## 2018-01-27 ASSESSMENT — PAIN DESCRIPTION - ONSET: ONSET: ON-GOING

## 2018-01-27 ASSESSMENT — PAIN DESCRIPTION - PAIN TYPE: TYPE: SURGICAL PAIN

## 2018-01-27 ASSESSMENT — PAIN DESCRIPTION - ORIENTATION: ORIENTATION: RIGHT;LEFT

## 2018-01-27 ASSESSMENT — PAIN DESCRIPTION - PROGRESSION: CLINICAL_PROGRESSION: GRADUALLY IMPROVING

## 2018-01-27 ASSESSMENT — PAIN SCALES - GENERAL
PAINLEVEL_OUTOF10: 5
PAINLEVEL_OUTOF10: 4
PAINLEVEL_OUTOF10: 7

## 2018-01-27 ASSESSMENT — PAIN DESCRIPTION - DESCRIPTORS: DESCRIPTORS: ACHING

## 2018-01-27 ASSESSMENT — PAIN DESCRIPTION - LOCATION: LOCATION: BREAST

## 2018-01-27 NOTE — OP NOTE
reapproximated with silk  mattress sutures and surgical staples. The patient tolerated the procedure  well. Sterile dressing was applied and the patient was brought back to  recovery room in stable condition.         Gray Sanon M.D.    D: 01/26/2018 10:12:26       T: 01/26/2018 11:54:39     [email or web address]  Job#: [id number]     Doc#: [id number]    CC:

## 2018-01-27 NOTE — PROGRESS NOTES
Call from [address] advised room ready for pt. Updated family.  Patient alert and talking with family at bedside
Patient returned to [facility] room 2. Ice water given per pt request.  Snack offered pt refused Family at bedside. Call light in reach.
Pt admitted to 5E59  per bed from surgery s/p right mastectomy/left breast implant removal  by Dr Treviño/Dr Hans Haynes. Vitals: see flowsheet. IV of normal saline infusing into RA at 100 with 200 mls to count. RA IV site free of s/s of infiltration or infection. Bilateral SCD's in place on lower extremities. Instructed in use of call light, incentive spirometer, bed and tv controls and plan of care. Pt verbalized understanding of instructions. Family at bedside.
Pt returned to [phone] per bed from surgery s/p evacution of clots  by Dr Natcaha Sullivan. IV of NS infusing into left arm at 100 ml per hour with 150 mls to count. IV site free of s/s of infiltration or infection. Chest dressing dry and intact, surgi bra on. Chest clear, active bowel sounds. Alert. Family at bedside. Bilateral SCD's in place on lower extremities. Instructed in use of call light, incentive spirometer, bed and tv controls and plan of care. Pt verbalized understanding of instructions.
Tildon Bianchi Dr. Brice Soulier  Postoperative Progress Note    Pt Name: Ty Méndez  Medical Record Number: [id number]  Date of Birth 1950   Today's Date: 1/27/2018    RANDA Allen is doing well. Pain is well controlled. She has no nausea or vomiting. She states she is ready to go home. OBJECTIVE  VITALS: VITALS:  BP (!) 104/59   Pulse 91   Temp 98.2 °F (36.8 °C) (Oral)   Resp 12   Ht 5' 3\" (1.6 m)   Wt 157 lb 10.1 oz (71.5 kg)   SpO2 94%   BMI 27.92 kg/m²   GENERAL: alert, cooperative, no acute distress  LUNGS: clear to ausculation, without wheezes, rales or rhonci  HEART: normal rate  ABDOMEN: soft, non-tender  INCISION: Wound is intact. Dressings dry. Some ecchymoses of skin as expected post evacuation of hematoma. No evidence of significant hematoma below the skin flaps  EXTERMITY: no cyanosis or edema  INTAKE/OUTPUT :   INTAKE/OUTPUT:    Intake/Output Summary (Last 24 hours) at 01/27/18 0921  Last data filed at 01/27/18 0630   Gross per 24 hour   Intake             1529 ml   Output             3790 ml   Net            -2261 ml        LABS  CBC with Differential:  No results found for: WBC, RBC, HGB, HCT, PLT, MCV, MCH, MCHC, RDW, NRBC, SEGSPCT, BANDSPCT, BLASTSPCT, METASPCT, LYMPHOPCT, PROMYELOPCT, MONOPCT, MYELOPCT, EOSPCT, BASOPCT, MONOSABS, LYMPHSABS, EOSABS, BASOSABS, DIFFTYPE  CMP:    Lab Results   Component Value Date    CREATININE 0.5 01/26/2018    LABGLOM >90 01/26/2018         RADIOLOGY: None    ASSESSMENT  1. POD # 1 evacuation hematoma, post op day #2 post mastectomy sentinel lymph node biopsy and removal of left sided implant  2. Stable no evidence of significant bleeding    PLAN  1. continue drains  2. VTE: SCDs, no Lovenox risk of bleeding  3. Home today. Orders written. Follow-up Dr. Christiana Pillai  next week. Follow-up Dr. Kelsey Jones as instructed.     Brice Soulier, MD  Electronically signed 1/27/2018 at 7:47 AM
In: 2109 [P.O.:25; I.V.:2084]  Out: 1201 [LNEQB:8229; Drains:295]    I/O last 3 completed shifts: In: 1528 [P.O.:25; I.V.:1503]  Out: 585 [Urine:420; Drains:145; Blood:20]       Date 01/26/18 0000 - 01/26/18 2359   Shift [id number] 7356-3025 0997-3779 24 Hour Total   I  N  T  A  K  E   I.V.  (mL/kg) 581  (8.1)   581  (8.1)    Shift Total  (mL/kg) 581  (8.1)   581  (8.1)   O  U  T  P  U  T   Urine  (mL/kg/hr) 650   650    Drains  (mL/kg) 150  (2.1)   150  (2.1)    Shift Total  (mL/kg) 800  (11.2)   800  (11.2)   Weight (kg) 71.5 71.5 71.5 71.5       Wt Readings from Last 3 Encounters:   01/25/18 157 lb 10.1 oz (71.5 kg)   01/08/18 160 lb 8 oz (72.8 kg)   11/30/15 158 lb (71.7 kg)        Body mass index is 27.92 kg/m². Diet: Diet NPO Effective Now Exceptions are: Other (See Comment)        MEDS:     Scheduled Meds:   levothyroxine  75 mcg Oral Daily    venlafaxine  75 mg Oral Daily    sodium chloride flush  10 mL Intravenous 2 times per day    enoxaparin  40 mg Subcutaneous Daily     Continuous Infusions:   lactated ringers       PRN Meds:  sodium chloride flush 10 mL PRN   acetaminophen 650 mg Q4H PRN   ondansetron 4 mg Q6H PRN   HYDROcodone 5 mg - acetaminophen 1 tablet Q4H PRN   Or     HYDROcodone 5 mg - acetaminophen 2 tablet Q4H PRN   morphine 2 mg Q2H PRN   Or     morphine 4 mg Q2H PRN         PHYSICAL EXAM:     CONSTITUTIONAL: Alert and oriented times 3, no acute distress and cooperative to examination. WOUND/INCISION:  Moderate size hematoma on right none on left , soft but will take some time to resolve, best to evacuate, right drain 160, left breast drain  135  EXTREMITY: no cyanosis, clubbing or edema      LABS:     CBC: No results for input(s): WBC, RBC, HGB, HCT, MCV, MCH, MCHC, RDW, PLT, MPV in the last 72 hours. Last 3 CMP:   No results for input(s): NA, K, CL, CO2, BUN, CREATININE, GLUCOSE, CALCIUM, PROT, LABALBU, BILITOT, ALKPHOS, AST, ALT in the last 72 hours.    Troponin: No results

## 2018-01-30 NOTE — PROGRESS NOTES
Lindsay Ansari. Jessa Donahue MD [facility]  General Surgery  Postprocedure Evaluation in Office  Pt Name: Gwyn Orr  Date of Birth 1950   Today's Date: 1/31/2018  Medical Record Number: [id number]  Referring Provider: No ref. 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 Luis Ventura   Past Medical History:   Diagnosis Date    Breast cancer [facility])     left and right    Depression     Elevated liver enzymes     no meds    Hypothyroidism        Carcinoma of upper-inner quadrant of female breast, right [facility])    Staging form: Breast, AJCC 8th Edition    - Clinical stage from 1/6/2018: Stage IA (cT1b, cN0, cM0, G3, ER: Positive, AL: Positive, HER2: Negative) - Signed by Joyce Kimble MD on 1/6/2018    - Pathologic stage from 1/30/2018: Stage IA (pT1c, pN0(sn), cM0, G3, ER: Positive, AL: Positive, HER2: Negative) - Signed by Joyce Kimble MD on 1/30/2018  PLANS       There are no Patient Instructions on file for this visit. 1. Follow up: Return in about 5 days (around 2/5/2018). 3.    Orders Placed This Encounter:  No orders of the defined types were placed in this encounter. SUBJECTIVE   Subjective   Luis Ventura is seen today for  follow-up. She is 6 day(s) status post Right  simple mastectomy right (date - 1/25/2018). Her pathology was infiltrating ductal carcinoma T1c - Tumor more than 1 cm but not more than 2 cm in greatest dimension pN0 - No regional lymph node metastasis histologically, no additional examination for isolated tumor cells (ITC). M0 - No clinical or radiographic evidence of distant metastases.  Her stage was IA - T1*, N0, M0. The  estrogen receptor positive progesterone receptor positiveHer2/Sadia protein/oncogene negativeKi 67 - growth factor intermediate. She presents for routine breast cancer follow up. She had her drain sheet with her and she had 90 mL out yesterday and let's clearing and now serous appearing and she's had 30 mL thus far today. Olamide Blas was originally seen in December 2017 for A new and recent diagnosis of right breast cancer. .  She had a right diagnostic mammogram performed in [facility] on December 15, 2017. And they described an ill-defined density at 3:00 and +8 measuring 7 x 10 x 15 mm they recommended a BS GI scan which was performed and revealed increased uptake in this area. This was then followed by an ultrasound which revealed an 8 mm mass in the area and ultrasound guided biopsy was performed which came back poorly differentiated invasive ductal carcinoma. It was estrogen receptor positive progesterone receptor positive and HER-2/sadia not overexpressed.      We saw her in 2015  for right breast pain and negative mammogram and US and did a core bx in the office which was negative  We have previously performed a left simple mastectomy and sentinel lymph node biopsy in 2006 for what was proved to be a T1 1 N0 M0 invasive ductal cancer. She did have reconstruction done at the same time. It was estrogen receptor negative, and therefore she had no antiestrogen therapy. .         She is 9 year(s) status post Left  simple mastectomy left (date - 2006). Her pathology was infiltrating ductal carcinoma T1a - Tumor more than 0.1 cm but not more than 0.5 cm in greatest dimension pN0 - No regional lymph node metastasis histologically, no additional examination for isolated tumor cells (ITC). M0 - No clinical or radiographic evidence of distant metastases. Her stage was IA - T1*, N0, M0. The  estrogen receptor negative progesterone receptor negative.  She did not have antiestrogen   Past Medical

## 2018-01-31 ENCOUNTER — OFFICE VISIT (OUTPATIENT)
Dept: SURGERY | Age: 68
End: 2018-01-31

## 2018-01-31 VITALS
HEART RATE: 110 BPM | BODY MASS INDEX: 27.64 KG/M2 | OXYGEN SATURATION: 95 % | TEMPERATURE: 97.6 F | HEIGHT: 63 IN | WEIGHT: 156 LBS | RESPIRATION RATE: 18 BRPM | DIASTOLIC BLOOD PRESSURE: 62 MMHG | SYSTOLIC BLOOD PRESSURE: 118 MMHG

## 2018-01-31 DIAGNOSIS — C50.211 CARCINOMA OF UPPER-INNER QUADRANT OF FEMALE BREAST, RIGHT (HCC): ICD-10-CM

## 2018-01-31 DIAGNOSIS — Z90.11 S/P MASTECTOMY, RIGHT: Primary | ICD-10-CM

## 2018-01-31 PROCEDURE — 99024 POSTOP FOLLOW-UP VISIT: CPT | Performed by: SURGERY

## 2018-01-31 RX ORDER — ESOMEPRAZOLE MAGNESIUM 20 MG/1
20 FOR SUSPENSION ORAL DAILY
COMMUNITY

## 2018-02-05 ENCOUNTER — OFFICE VISIT (OUTPATIENT)
Dept: SURGERY | Age: 68
End: 2018-02-05

## 2018-02-05 VITALS
WEIGHT: 154.7 LBS | OXYGEN SATURATION: 96 % | DIASTOLIC BLOOD PRESSURE: 76 MMHG | SYSTOLIC BLOOD PRESSURE: 138 MMHG | HEART RATE: 97 BPM | RESPIRATION RATE: 16 BRPM | TEMPERATURE: 97 F | HEIGHT: 63 IN | BODY MASS INDEX: 27.41 KG/M2

## 2018-02-05 DIAGNOSIS — C50.211 CARCINOMA OF UPPER-INNER QUADRANT OF FEMALE BREAST, RIGHT (HCC): Primary | ICD-10-CM

## 2018-02-05 DIAGNOSIS — Z90.11 S/P MASTECTOMY, RIGHT: ICD-10-CM

## 2018-02-05 DIAGNOSIS — Z90.12 HISTORY OF LEFT MASTECTOMY: ICD-10-CM

## 2018-02-05 PROCEDURE — 99024 POSTOP FOLLOW-UP VISIT: CPT | Performed by: SURGERY

## 2018-02-05 NOTE — PROGRESS NOTES
Hayde Soto. America Nunez MD FACS  General Surgery  Postprocedure Evaluation in Office  Pt Name: Mary Alcocer  Date of Birth 1950   Today's Date: 2/5/2018  Medical Record Number: [id number]  Referring Provider: No ref. provider found  Primary Care Provider: Malvin Gaines MD  Chief Complaint   Patient presents with    Post-Op Check     5 day f/u--s/p Right Simple Mastectomy and SLN Bx-1/25/18, Evacuation of mastectomy site, right breast hematoma-1/26/18     ASSESSMENT      Problem List Items Addressed This Visit     History of left mastectomy    Carcinoma of upper-inner quadrant of female breast, right [facility]) - Primary    Relevant Orders    Ambulatory referral to Hematology Oncology    S/P mastectomy, right      Other Visit Diagnoses    None. Rosalba Piña   Past Medical History:   Diagnosis Date    Breast cancer [facility])     left and right    Depression     Elevated liver enzymes     no meds    Hypothyroidism        Carcinoma of upper-inner quadrant of female breast, right [facility])    Staging form: Breast, AJCC 8th Edition    - Clinical stage from 1/6/2018: Stage IA (cT1b, cN0, cM0, G3, ER: Positive, MA: Positive, HER2: Negative) - Signed by Thao Adames MD on 1/6/2018    - Pathologic stage from 1/30/2018: Stage IA (pT1c, pN0(sn), cM0, G3, ER: Positive, MA: Positive, HER2: Negative) - Signed by Thao Adames MD on 1/30/2018  PLANS       There are no Patient Instructions on file for this visit. 1. Follow up: Return in about 1 week (around 2/12/2018).   3.  1/2 staples and drain removed  Orders Placed This Encounter:  Orders Placed This Encounter   Procedures    Ambulatory referral to Hematology Oncology     Referral Priority:   Routine     Referral Type:   Consult for Advice and Opinion     Referral Reason:   Specialty Services Required     Referred to Provider:   Latha Moore MD     Requested Specialty:   Hematology and Oncology     Number of Visits Requested:   1         SUBJECTIVE   Subjective Alvarez Moore is seen today for  follow-up. She is 11 day(s) status post Right  simple mastectomy right (date - 1/25/2018). Her pathology was infiltrating ductal carcinoma T1c - Tumor more than 1 cm but not more than 2 cm in greatest dimension pN0 - No regional lymph node metastasis histologically, no additional examination for isolated tumor cells (ITC). M0 - No clinical or radiographic evidence of distant metastases. Her stage was IA - T1*, N0, M0. The  estrogen receptor positive progesterone receptor positiveHer2/Sadia protein/oncogene negativeKi 67 - growth factor intermediate. She presents for routine breast cancer follow up. She had her drain sheet with her and she had 90 mL out yesterday and let's clearing and now serous appearing and she's had 30 mL thus far today. Alvarez Moore was originally seen in December 2017 for A new and recent diagnosis of right breast cancer. .  She had a right diagnostic mammogram performed in [facility] on December 15, 2017. And they described an ill-defined density at 3:00 and +8 measuring 7 x 10 x 15 mm they recommended a BS GI scan which was performed and revealed increased uptake in this area. This was then followed by an ultrasound which revealed an 8 mm mass in the area and ultrasound guided biopsy was performed which came back poorly differentiated invasive ductal carcinoma. It was estrogen receptor positive progesterone receptor positive and HER-2/sadia not overexpressed.      We saw her in 2015  for right breast pain and negative mammogram and US and did a core bx in the office which was negative  We have previously performed a left simple mastectomy and sentinel lymph node biopsy in 2006 for what was proved to be a T1 1 N0 M0 invasive ductal cancer. She did have reconstruction done at the same time. It was estrogen receptor negative, and therefore she had no antiestrogen therapy. .         She is 9 year(s) status post Left  simple mastectomy left (date - 2006).   Her pathology was infiltrating ductal carcinoma T1a - Tumor more than 0.1 cm but not more than 0.5 cm in greatest dimension pN0 - No regional lymph node metastasis histologically, no additional examination for isolated tumor cells (ITC). M0 - No clinical or radiographic evidence of distant metastases. Her stage was IA - T1*, N0, M0. The  estrogen receptor negative progesterone receptor negative.  She did not have antiestrogen   Past Medical History  Past Medical History:   Diagnosis Date    Breast cancer ([address]     left and right    Depression     Elevated liver enzymes     no meds    Hypothyroidism      Past Surgical History  Past Surgical History:   Procedure Laterality Date    APPENDECTOMY  as a child    3years old    BREAST BIOPSY Right 12/28/2017    [address] SURGERY Left 1/25/2018    REMOVAL OF LET SIDE IMPLANT performed by Eric Rowland MD at [facility]  2010    Dr Michele [facility]  2006    reconstruction breast -Dr Angel Luis Patel Right 2005    Dr. Scott Sanabria Left 2006    Dr Abby Merritt Right 1/26/2018    RIGHT BREAST POST-OP HEMATOMA EVACUATION performed by Nadia Bianchi MD at [place] [address], PARTIAL Right 1/25/2018    RIGHT BREAST SIMPLE MASTECTOMY AND SENTINEL LYMPH NODE BIOPSY performed by Nadia Bianchi MD at [address]     Medications  Current Outpatient Prescriptions on File Prior to Visit   Medication Sig Dispense Refill    esomeprazole Magnesium (NEXIUM) 20 MG PACK Take 20 mg by mouth daily      Multiple Vitamins-Minerals (MULTI FOR HER 50+) TABS Take 1 tablet by mouth daily      Calcium Carb-Cholecalciferol (CALCIUM 500 + D3 PO) Take 2 tablets by mouth daily      Lactobacillus (PROBIOTIC ACIDOPHILUS) CAPS Take 1 capsule by mouth daily      venlafaxine (EFFEXOR) 75 MG tablet Take 75 mg by mouth daily      levothyroxine (SYNTHROID) 75 MCG

## 2018-02-08 NOTE — PROGRESS NOTES
metastasis histologically, no additional examination for isolated tumor cells (ITC). M0 - No clinical or radiographic evidence of distant metastases. Her stage was IA - T1*, N0, M0. The  estrogen receptor positive progesterone receptor positiveHer2/Sadia protein/oncogene negativeKi 67 - growth factor intermediate. She presents for routine breast cancer follow up. Has a seroma on the right. Marion Varela was originally seen in December 2017 for A new and recent diagnosis of right breast cancer. .  She had a right diagnostic mammogram performed in [facility] on December 15, 2017. And they described an ill-defined density at 3:00 and +8 measuring 7 x 10 x 15 mm they recommended a BS GI scan which was performed and revealed increased uptake in this area. This was then followed by an ultrasound which revealed an 8 mm mass in the area and ultrasound guided biopsy was performed which came back poorly differentiated invasive ductal carcinoma. It was estrogen receptor positive progesterone receptor positive and HER-2/sadia not overexpressed.      We saw her in 2015  for right breast pain and negative mammogram and US and did a core bx in the office which was negative  We have previously performed a left simple mastectomy and sentinel lymph node biopsy in 2006 for what was proved to be a T1 1 N0 M0 invasive ductal cancer. She did have reconstruction done at the same time. It was estrogen receptor negative, and therefore she had no antiestrogen therapy. .         She is 9 year(s) status post Left  simple mastectomy left (date - 2006). Her pathology was infiltrating ductal carcinoma T1a - Tumor more than 0.1 cm but not more than 0.5 cm in greatest dimension pN0 - No regional lymph node metastasis histologically, no additional examination for isolated tumor cells (ITC). M0 - No clinical or radiographic evidence of distant metastases. Her stage was IA - T1*, N0, M0.  The  estrogen receptor negative progesterone receptor negative. She did not have antiestrogen   Past Medical History  Past Medical History:   Diagnosis Date    Breast cancer ([address]     left and right    Depression     Elevated liver enzymes     no meds    Hypothyroidism      Past Surgical History  Past Surgical History:   Procedure Laterality Date    APPENDECTOMY  as a child    3years old    BREAST BIOPSY Right 12/28/2017    [address] [place] [address] Dr SURGERY Left 1/25/2018    REMOVAL OF LET SIDE IMPLANT performed by Corazon Beck MD at [facility]  2010    Dr Bellamy Larger  2006    reconstruction breast -Dr Sissy Roque Right 2005    Dr. Roland Gerard Left 2006    Dr Osmin Holm Right 1/26/2018    RIGHT BREAST POST-OP HEMATOMA EVACUATION performed by Dorinda Miguel MD at [place] [address], PARTIAL Right 1/25/2018    RIGHT BREAST SIMPLE MASTECTOMY AND SENTINEL LYMPH NODE BIOPSY performed by Dorinda Miguel MD at [place] [facility]     Medications  Current Outpatient Prescriptions on File Prior to Visit   Medication Sig Dispense Refill    esomeprazole Magnesium (NEXIUM) 20 MG PACK Take 20 mg by mouth daily      Multiple Vitamins-Minerals (MULTI FOR HER 50+) TABS Take 1 tablet by mouth daily      Calcium Carb-Cholecalciferol (CALCIUM 500 + D3 PO) Take 2 tablets by mouth daily      Lactobacillus (PROBIOTIC ACIDOPHILUS) CAPS Take 1 capsule by mouth daily      venlafaxine (EFFEXOR) 75 MG tablet Take 75 mg by mouth daily      levothyroxine (SYNTHROID) 75 MCG tablet Take 75 mcg by mouth Daily       No current facility-administered medications on file prior to visit. Allergies  No Known Allergies  Social History  Social History     Social History    Marital status:      Spouse name: N/A    Number of children: 2    Years of education: N/A     Occupational History    Not on file.      Social History Main Topics    Smoking status: Former Smoker     Packs/day: 0.25     Years: 15.00     Types: Cigarettes     Quit date: 1/1/2005    Smokeless tobacco: Never Used    Alcohol use 0.0 oz/week      Comment: social    Drug use: No    Sexual activity: Not on file     Other Topics Concern    Not on file     Social History Narrative    No narrative on file     [address] Maintenance   Topic Date Due    Hepatitis C screen  1950    DTaP/Tdap/Td vaccine (1 - Tdap) 08/10/1967    Lipid screen  08/10/1988    Diabetes screen  08/10/1988    Colon cancer screen colonoscopy  08/10/1998    Zostavax vaccine  08/10/2008    DEXA (modify frequency per FRAX score)  08/10/2013    Pneumococcal low/med risk (1 of 2 - PCV13) 08/10/2013    Flu vaccine (1) 09/01/2017    Breast cancer screen  12/08/2017     Review of Systems  History obtained from the patient. Constitutional: Denies any fever, chills, fatigue. Wound: Denies any rash, skin color changes or wound problems. Resp: Denies any cough, shortness of breath. CV: Denies any chest pain, orthopnea or syncope. GI: Denies any nausea, vomiting, blood in the stool, constipation or diarrhea. OBJECTIVE      VITALS:  height is 5' 3\" (1.6 m) and weight is 156 lb 14.4 oz (71.2 kg). Her tympanic temperature is 96.5 °F (35.8 °C). Her blood pressure is 140/80 (abnormal) and her pulse is 90. Her respiration is 18 and oxygen saturation is 95%. CONSTITUTIONAL: Alert and oriented times 3, no acute distress and cooperative to examination. SKIN: Skin color, texture, turgor normal. No rashes or lesions. NECK: Soft, trachea midline and straight  BREAST: The right breast surgically absent tThe left breast is surgically absent 100 cc seroma drained serous appearing  NEUROLOGIC: No sensory or motor nerve irritation  EXTREMITIES: no cyanosis, no clubbing and no edema. Chikis Miller.  Hans Haynes MD FACS  2/12/2018  10:42 AM

## 2018-02-12 ENCOUNTER — OFFICE VISIT (OUTPATIENT)
Dept: SURGERY | Age: 68
End: 2018-02-12

## 2018-02-12 VITALS
BODY MASS INDEX: 27.8 KG/M2 | TEMPERATURE: 96.5 F | OXYGEN SATURATION: 95 % | RESPIRATION RATE: 18 BRPM | HEIGHT: 63 IN | WEIGHT: 156.9 LBS | HEART RATE: 90 BPM | SYSTOLIC BLOOD PRESSURE: 138 MMHG | DIASTOLIC BLOOD PRESSURE: 78 MMHG

## 2018-02-12 DIAGNOSIS — Z90.11 S/P MASTECTOMY, RIGHT: ICD-10-CM

## 2018-02-12 DIAGNOSIS — C50.211 CARCINOMA OF UPPER-INNER QUADRANT OF FEMALE BREAST, RIGHT (HCC): Primary | ICD-10-CM

## 2018-02-12 DIAGNOSIS — Z90.12 HISTORY OF LEFT MASTECTOMY: ICD-10-CM

## 2018-02-12 DIAGNOSIS — Z85.3 PERSONAL HISTORY OF BREAST CANCER: ICD-10-CM

## 2018-02-12 PROCEDURE — 99024 POSTOP FOLLOW-UP VISIT: CPT | Performed by: SURGERY

## 2018-02-16 NOTE — PROGRESS NOTES
Edelmira Russell. Tala Hanson MD FACS  General Surgery  Postprocedure Evaluation in Office  Pt Name: Santosh Turpin  Date of Birth 1950   Today's Date: 2/19/2018  Medical Record Number: [id number]  Referring Provider: No ref. provider found  Primary Care Provider: Yandy Nguyen MD  Chief Complaint   Patient presents with    Post-Op Check     1 week f/u--s/p Right Simple Mastectomy and SLN Bx-1/25/18, Evacuation of mastectomy site, right breast hematoma-1/26/18. ASSESSMENT      Problem List Items Addressed This Visit     History of left mastectomy    Carcinoma of upper-inner quadrant of female breast, right ([address] - Primary    S/P mastectomy, right      Other Visit Diagnoses    None. Chauncey Hewitt   Past Medical History:   Diagnosis Date    Breast cancer [facility])     left and right    Depression     Elevated liver enzymes     no meds    Hypothyroidism        Carcinoma of upper-inner quadrant of female breast, right [facility])    Staging form: Breast, AJCC 8th Edition    - Clinical stage from 1/6/2018: Stage IA (cT1b, cN0, cM0, G3, ER: Positive, CO: Positive, HER2: Negative) - Signed by Lavell Martinez MD on 1/6/2018    - Pathologic stage from 1/30/2018: Stage IA (pT1c, pN0(sn), cM0, G3, ER: Positive, CO: Positive, HER2: Negative) - Signed by Lavell Martinez MD on 1/30/2018  PLANS       There are no Patient Instructions on file for this visit. 1. Follow up: Return in about 1 week (around 2/26/2018). 2. Sees Dr Nannette Gaytan on thursday  3. Rest staples  removed  Orders Placed This Encounter:  No orders of the defined types were placed in this encounter. SUBJECTIVE   Subjective   Chauncey Hewitt is seen today for  follow-up. She is 25 day(s) status post Right  simple mastectomy right (date - 1/25/2018).   Her pathology was infiltrating ductal carcinoma T1c - Tumor more than 1 cm but not more than 2 cm in greatest dimension pN0 - No regional lymph node metastasis histologically, no additional examination for isolated tumor cells (ITC). M0 - No clinical or radiographic evidence of distant metastases. Her stage was IA - T1*, N0, M0. The  estrogen receptor positive progesterone receptor positiveHer2/Sadia protein/oncogene negativeKi 67 - growth factor intermediate. She presents for routine breast cancer follow up. Has a seroma on the right. Harpal Lenz was originally seen in December 2017 for A new and recent diagnosis of right breast cancer. .  She had a right diagnostic mammogram performed in [facility] on December 15, 2017. And they described an ill-defined density at 3:00 and +8 measuring 7 x 10 x 15 mm they recommended a BS GI scan which was performed and revealed increased uptake in this area. This was then followed by an ultrasound which revealed an 8 mm mass in the area and ultrasound guided biopsy was performed which came back poorly differentiated invasive ductal carcinoma. It was estrogen receptor positive progesterone receptor positive and HER-2/sadia not overexpressed.      We saw her in 2015  for right breast pain and negative mammogram and US and did a core bx in the office which was negative  We have previously performed a left simple mastectomy and sentinel lymph node biopsy in 2006 for what was proved to be a T1 1 N0 M0 invasive ductal cancer. She did have reconstruction done at the same time. It was estrogen receptor negative, and therefore she had no antiestrogen therapy. .         She is 9 year(s) status post Left  simple mastectomy left (date - 2006). Her pathology was infiltrating ductal carcinoma T1a - Tumor more than 0.1 cm but not more than 0.5 cm in greatest dimension pN0 - No regional lymph node metastasis histologically, no additional examination for isolated tumor cells (ITC). M0 - No clinical or radiographic evidence of distant metastases. Her stage was IA - T1*, N0, M0. The  estrogen receptor negative progesterone receptor negative.  She did not have antiestrogen   Past Medical History  Past Medical History:   Diagnosis Date    [facility] [address] [address]     left and right    Depression     Elevated liver enzymes     no meds    Hypothyroidism      Past Surgical History  Past Surgical History:   Procedure Laterality Date    APPENDECTOMY  as a child    3years old    BREAST BIOPSY Right 12/28/2017    [address] [place] [address] Dr SURGERY Left 1/25/2018    REMOVAL OF LET SIDE IMPLANT performed by Sherlene Lombard, MD at [facility]  2010    Dr Selin Dunn  2006    reconstruction breast -Dr Pastor Mcclain Right 2005    Dr. Flower Barrow Left 2006    Dr Spencer Later Right 1/26/2018    RIGHT BREAST POST-OP HEMATOMA EVACUATION performed by Nikki Barrios MD at [place] [address], PARTIAL Right 1/25/2018    RIGHT BREAST SIMPLE MASTECTOMY AND SENTINEL LYMPH NODE BIOPSY performed by Nikki Barrios MD at [address]     Medications  Current Outpatient Prescriptions on File Prior to Visit   Medication Sig Dispense Refill    esomeprazole Magnesium (NEXIUM) 20 MG PACK Take 20 mg by mouth daily      Multiple Vitamins-Minerals (MULTI FOR HER 50+) TABS Take 1 tablet by mouth daily      Calcium Carb-Cholecalciferol (CALCIUM 500 + D3 PO) Take 2 tablets by mouth daily      Lactobacillus (PROBIOTIC ACIDOPHILUS) CAPS Take 1 capsule by mouth daily      venlafaxine (EFFEXOR) 75 MG tablet Take 75 mg by mouth daily      levothyroxine (SYNTHROID) 75 MCG tablet Take 75 mcg by mouth Daily       No current facility-administered medications on file prior to visit. Allergies  No Known Allergies  Social History  Social History     Social History    Marital status:      Spouse name: N/A    Number of children: 2    Years of education: N/A     Occupational History    Not on file.      Social History Main Topics    Smoking status: Former Smoker     Packs/day: 0.25     Years: 15.00     Types: Cigarettes Quit date: 1/1/2005    Smokeless tobacco: Never Used    Alcohol use 0.0 oz/week      Comment: social    Drug use: No    Sexual activity: Not on file     Other Topics Concern    Not on file     Social History Narrative    No narrative on file     [address] Maintenance   Topic Date Due    Hepatitis C screen  1950    DTaP/Tdap/Td vaccine (1 - Tdap) 08/10/1967    Lipid screen  08/10/1988    Diabetes screen  08/10/1988    Colon cancer screen colonoscopy  08/10/1998    Zostavax vaccine  08/10/2008    DEXA (modify frequency per FRAX score)  08/10/2013    Pneumococcal low/med risk (1 of 2 - PCV13) 08/10/2013    Flu vaccine (1) 09/01/2017    Breast cancer screen  12/08/2017     Review of Systems  History obtained from the patient. Constitutional: Denies any fever, chills, fatigue. Wound: Denies any rash, skin color changes or wound problems. Resp: Denies any cough, shortness of breath. CV: Denies any chest pain, orthopnea or syncope. GI: Denies any nausea, vomiting, blood in the stool, constipation or diarrhea. OBJECTIVE      VITALS:  height is 5' 3\" (1.6 m) and weight is 156 lb 11.2 oz (71.1 kg). Her tympanic temperature is 97.7 °F (36.5 °C). Her blood pressure is 132/62 and her pulse is 64. Her respiration is 18 and oxygen saturation is 95%. CONSTITUTIONAL: Alert and oriented times 3, no acute distress and cooperative to examination. SKIN: Skin color, texture, turgor normal. No rashes or lesions. NECK: Soft, trachea midline and straight  BREAST: The right breast surgically absent tThe left breast is surgically absent 90 cc seroma  NEUROLOGIC: No sensory or motor nerve irritation  EXTREMITIES: no cyanosis, no clubbing and no edema. Lindsay Ansari.  Jessa Donahue MD FACS  2/19/2018  3:47 PM

## 2018-02-19 ENCOUNTER — OFFICE VISIT (OUTPATIENT)
Dept: SURGERY | Age: 68
End: 2018-02-19

## 2018-02-19 VITALS
DIASTOLIC BLOOD PRESSURE: 62 MMHG | RESPIRATION RATE: 18 BRPM | TEMPERATURE: 97.7 F | WEIGHT: 156.7 LBS | OXYGEN SATURATION: 95 % | BODY MASS INDEX: 27.77 KG/M2 | HEART RATE: 64 BPM | SYSTOLIC BLOOD PRESSURE: 132 MMHG | HEIGHT: 63 IN

## 2018-02-19 DIAGNOSIS — Z90.12 HISTORY OF LEFT MASTECTOMY: ICD-10-CM

## 2018-02-19 DIAGNOSIS — C50.211 CARCINOMA OF UPPER-INNER QUADRANT OF FEMALE BREAST, RIGHT (HCC): Primary | ICD-10-CM

## 2018-02-19 DIAGNOSIS — Z90.11 S/P MASTECTOMY, RIGHT: ICD-10-CM

## 2018-02-19 PROCEDURE — 99024 POSTOP FOLLOW-UP VISIT: CPT | Performed by: SURGERY

## 2018-02-25 NOTE — PROGRESS NOTES
Linda Montgomery. Jane Rust MD [facility]  General Surgery  Postprocedure Evaluation in Office  Pt Name: Court Vanegas  Date of Birth 1950   Today's Date: 2/26/2018  Medical Record Number: [id number]  Referring Provider: No ref. provider found  Primary Care Provider: Foreign Reyes MD  Chief Complaint   Patient presents with    Post-Op Check     1 week f/u--s/p Right Simple Mastectomy and SLN Bx-1/25/18, Evacuation of mastectomy site, right breast hematoma-1/26/18. 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 1. Follow up: Return in about 3 weeks (around 3/19/2018). 2. Sees Dr Aysha Gorman on thursday  3. Rest staples  removed  Orders Placed This Encounter:  No orders of the defined types were placed in this encounter. SUBJECTIVE   Subjective   Nathaly Coleman is seen today for  follow-up. She is 23 day(s) status post Right  simple mastectomy right (date - 1/25/2018). Her pathology was infiltrating ductal carcinoma T1c - Tumor more than 1 cm but not more than 2 cm in greatest dimension pN0 - No regional lymph node metastasis histologically, no additional examination for isolated tumor cells (ITC).    M0 - No clinical or radiographic evidence of

## 2018-02-26 ENCOUNTER — OFFICE VISIT (OUTPATIENT)
Dept: SURGERY | Age: 68
End: 2018-02-26

## 2018-02-26 VITALS
BODY MASS INDEX: 27.41 KG/M2 | HEIGHT: 63 IN | TEMPERATURE: 97.1 F | SYSTOLIC BLOOD PRESSURE: 120 MMHG | DIASTOLIC BLOOD PRESSURE: 62 MMHG | WEIGHT: 154.7 LBS | HEART RATE: 88 BPM | RESPIRATION RATE: 18 BRPM | OXYGEN SATURATION: 96 %

## 2018-02-26 DIAGNOSIS — C50.211 CARCINOMA OF UPPER-INNER QUADRANT OF FEMALE BREAST, RIGHT (HCC): Primary | ICD-10-CM

## 2018-02-26 DIAGNOSIS — Z90.11 S/P MASTECTOMY, RIGHT: ICD-10-CM

## 2018-02-26 PROCEDURE — 99024 POSTOP FOLLOW-UP VISIT: CPT | Performed by: SURGERY

## 2018-03-28 ENCOUNTER — OFFICE VISIT (OUTPATIENT)
Dept: SURGERY | Age: 68
End: 2018-03-28

## 2018-03-28 VITALS
OXYGEN SATURATION: 95 % | DIASTOLIC BLOOD PRESSURE: 80 MMHG | BODY MASS INDEX: 27.59 KG/M2 | SYSTOLIC BLOOD PRESSURE: 126 MMHG | WEIGHT: 155.7 LBS | HEART RATE: 80 BPM | HEIGHT: 63 IN | RESPIRATION RATE: 16 BRPM | TEMPERATURE: 97.5 F

## 2018-03-28 DIAGNOSIS — Z90.11 S/P MASTECTOMY, RIGHT: ICD-10-CM

## 2018-03-28 DIAGNOSIS — C50.211 CARCINOMA OF UPPER-INNER QUADRANT OF FEMALE BREAST, RIGHT (HCC): Primary | ICD-10-CM

## 2018-03-28 PROCEDURE — 99024 POSTOP FOLLOW-UP VISIT: CPT | Performed by: SURGERY

## 2018-06-29 ENCOUNTER — CLINICAL DOCUMENTATION (OUTPATIENT)
Dept: WOMENS IMAGING | Age: 68
End: 2018-06-29

## 2018-10-23 ENCOUNTER — CLINICAL DOCUMENTATION (OUTPATIENT)
Dept: CASE MANAGEMENT | Age: 68
End: 2018-10-23

## 2019-06-13 ENCOUNTER — HOSPITAL ENCOUNTER (OUTPATIENT)
Dept: CT IMAGING | Age: 69
Discharge: HOME | End: 2019-06-13
Payer: MEDICARE

## 2019-06-13 DIAGNOSIS — R10.9 ABDOMINAL PAIN, UNSPECIFIED ABDOMINAL LOCATION: ICD-10-CM

## 2019-06-13 DIAGNOSIS — R63.4 WEIGHT LOSS: ICD-10-CM

## 2019-06-13 DIAGNOSIS — R10.30 LOWER ABDOMINAL PAIN: ICD-10-CM

## 2019-06-13 LAB — POC CREATININE WHOLE BLOOD: 0.6 MG/DL (ref 0.5–1.2)

## 2019-06-13 PROCEDURE — 82565 ASSAY OF CREATININE: CPT

## 2019-06-13 PROCEDURE — 6360000004 HC RX CONTRAST MEDICATION: Performed by: INTERNAL MEDICINE

## 2019-06-13 PROCEDURE — 74177 CT ABD & PELVIS W/CONTRAST: CPT

## 2019-06-13 RX ADMIN — IOHEXOL 50 ML: 240 INJECTION, SOLUTION INTRATHECAL; INTRAVASCULAR; INTRAVENOUS; ORAL at 10:14

## 2019-06-13 RX ADMIN — IOPAMIDOL 85 ML: 755 INJECTION, SOLUTION INTRAVENOUS at 10:14

## 2019-11-12 ENCOUNTER — HOSPITAL ENCOUNTER (OUTPATIENT)
Dept: CT IMAGING | Age: 69
Discharge: HOME | End: 2019-11-12
Payer: MEDICARE

## 2019-11-12 DIAGNOSIS — C50.311 MALIGNANT NEOPLASM OF LOWER-INNER QUADRANT OF RIGHT FEMALE BREAST, UNSPECIFIED ESTROGEN RECEPTOR STATUS (HCC): ICD-10-CM

## 2019-11-12 LAB — POC CREATININE WHOLE BLOOD: 0.6 MG/DL (ref 0.5–1.2)

## 2019-11-12 PROCEDURE — 71260 CT THORAX DX C+: CPT

## 2019-11-12 PROCEDURE — 6360000004 HC RX CONTRAST MEDICATION: Performed by: INTERNAL MEDICINE

## 2019-11-12 PROCEDURE — 82565 ASSAY OF CREATININE: CPT

## 2019-11-12 RX ADMIN — IOPAMIDOL 85 ML: 755 INJECTION, SOLUTION INTRAVENOUS at 07:48

## 2020-03-11 ENCOUNTER — HOSPITAL ENCOUNTER (OUTPATIENT)
Dept: CT IMAGING | Age: 70
Discharge: HOME | End: 2020-03-11
Payer: MEDICARE

## 2020-03-11 LAB — POC CREATININE WHOLE BLOOD: 0.6 MG/DL (ref 0.5–1.2)

## 2020-03-11 PROCEDURE — 71260 CT THORAX DX C+: CPT

## 2020-03-11 PROCEDURE — 82565 ASSAY OF CREATININE: CPT

## 2020-03-11 PROCEDURE — 6360000004 HC RX CONTRAST MEDICATION: Performed by: INTERNAL MEDICINE

## 2020-03-11 RX ADMIN — IOPAMIDOL 85 ML: 755 INJECTION, SOLUTION INTRAVENOUS at 13:14

## 2020-09-08 ENCOUNTER — HOSPITAL ENCOUNTER (OUTPATIENT)
Dept: CT IMAGING | Age: 70
Discharge: HOME | End: 2020-09-08
Payer: MEDICARE

## 2020-09-08 LAB — POC CREATININE WHOLE BLOOD: 0.5 MG/DL (ref 0.5–1.2)

## 2020-09-08 PROCEDURE — 82565 ASSAY OF CREATININE: CPT

## 2020-09-08 PROCEDURE — 6360000004 HC RX CONTRAST MEDICATION: Performed by: INTERNAL MEDICINE

## 2020-09-08 PROCEDURE — 71260 CT THORAX DX C+: CPT

## 2020-09-08 RX ADMIN — IOPAMIDOL 85 ML: 755 INJECTION, SOLUTION INTRAVENOUS at 09:06

## 2023-03-03 ENCOUNTER — HOSPITAL ENCOUNTER (OUTPATIENT)
Dept: CT IMAGING | Age: 73
Discharge: HOME | End: 2023-03-03
Payer: MEDICARE

## 2023-03-03 DIAGNOSIS — C50.211 MALIGNANT NEOPLASM OF UPPER-INNER QUADRANT OF RIGHT FEMALE BREAST, UNSPECIFIED ESTROGEN RECEPTOR STATUS (HCC): ICD-10-CM

## 2023-03-03 DIAGNOSIS — C50.912 MALIGNANT NEOPLASM OF LEFT FEMALE BREAST, UNSPECIFIED ESTROGEN RECEPTOR STATUS, UNSPECIFIED SITE OF BREAST (HCC): ICD-10-CM

## 2023-03-03 LAB
CREAT BLD-MCNC: 0.6 MG/DL (ref 0.5–1.2)
GFR SERPL CREATININE-BSD FRML MDRD: > 60 ML/MIN/1.73M2

## 2023-03-03 PROCEDURE — 6360000004 HC RX CONTRAST MEDICATION: Performed by: INTERNAL MEDICINE

## 2023-03-03 PROCEDURE — 82565 ASSAY OF CREATININE: CPT

## 2023-03-03 PROCEDURE — 71260 CT THORAX DX C+: CPT

## 2023-03-03 RX ADMIN — IOPAMIDOL 85 ML: 755 INJECTION, SOLUTION INTRAVENOUS at 13:52

## (undated) DEVICE — INTENDED FOR TISSUE SEPARATION, AND OTHER PROCEDURES THAT REQUIRE A SHARP SURGICAL BLADE TO PUNCTURE OR CUT.: Brand: BARD-PARKER ® CARBON RIB-BACK BLADES

## (undated) DEVICE — GLOVE SURG SZ 65 THK91MIL LTX FREE SYN POLYISOPRENE

## (undated) DEVICE — GLOVE ORANGE PI 8   MSG9080

## (undated) DEVICE — SOLUTION IV 1000ML 0.9% SOD CHL PH 5 INJ USP VIAFLX PLAS

## (undated) DEVICE — BLADE LARYNSCP SZ 3 ENH DIR INTUB GLIDESCOPE MCGRATH MAC

## (undated) DEVICE — SKIN AFFIX SURG ADHESIVE 72/CS 0.55ML: Brand: MEDLINE

## (undated) DEVICE — GLOVE ORANGE PI 7 1/2   MSG9075

## (undated) DEVICE — APPLICATOR PREP 26ML 0.7% IOD POVACRYLEX 74% ISO ALC ST

## (undated) DEVICE — 3M™ DURAPORE™ SURGICAL TAPE 1538-3, 3 INCH X 10 YARD (7,5CM X 9,1M), 4 ROLLS/BOX: Brand: 3M™ DURAPORE™

## (undated) DEVICE — BASIC SINGLE BASIN BTC-LF: Brand: MEDLINE INDUSTRIES, INC.

## (undated) DEVICE — DRAIN CHN 19FR L0.25IN DIA6.3MM SIL RND HUBLESS FULL FLUT

## (undated) DEVICE — APPLIER LIG CLP M L11IN TI STR RNG HNDL FOR 20 CLP DISP

## (undated) DEVICE — PACK PROCEDURE SURG SET UP SRMC

## (undated) DEVICE — YANKAUER,BULB TIP,W/O VENT,RIGID,STERILE: Brand: MEDLINE

## (undated) DEVICE — IMPREGNATED GAUZE DRESSING: Brand: CUTICERIN 7.5X20CM CTN 50

## (undated) DEVICE — SPONGE GZ W4XL4IN COT 12 PLY TYP VII WVN C FLD DSGN

## (undated) DEVICE — DRAPE,EENT,SPLIT,STERILE: Brand: MEDLINE

## (undated) DEVICE — 3M™ TRANSPORE™ WHITE SURGICAL TAPE 1534-2, 2 INCH X 10 YARD (5CM X 9,1M), 6 ROLLS/CARTON 10 CARTONS/CASE: Brand: 3M™ TRANSPORE™

## (undated) DEVICE — 4-PORT MANIFOLD: Brand: NEPTUNE 2

## (undated) DEVICE — TUBING, SUCTION, 1/4" X 20', STRAIGHT: Brand: MEDLINE INDUSTRIES, INC.

## (undated) DEVICE — TRAY CATH 16FR F INCLUDE LUB DRNGE BG STATLOK STBL DEV

## (undated) DEVICE — SHEET, T, LAPAROTOMY, STERILE: Brand: MEDLINE

## (undated) DEVICE — SYRINGE MED 10ML LUERLOCK TIP W/O SFTY DISP

## (undated) DEVICE — POSITIONER HD W8XH4XL8.5IN RASPBERRY FOAM SLT

## (undated) DEVICE — GLOVE ORANGE PI 7   MSG9070

## (undated) DEVICE — COVER US PRB W5XL96IN LTX W/ GEL

## (undated) DEVICE — Device

## (undated) DEVICE — BANDAGE,GAUZE,4.5"X4.1YD,STERILE,LF: Brand: MEDLINE

## (undated) DEVICE — SOLUTION IV IRRIG POUR BRL 0.9% SODIUM CHL 2F7124

## (undated) DEVICE — 3M™ BAIR HUGGER® MULTI ACCESS BLANKET, PEDIATRIC, FULL BODY, 10 PER CASE 31000: Brand: BAIR HUGGER™

## (undated) DEVICE — SPONGE LAP W18XL18IN WHT COT 4 PLY FLD STRUNG RADPQ DISP ST

## (undated) DEVICE — PAD,ABDOMINAL,5"X9",ST,LF,25/BX: Brand: MEDLINE INDUSTRIES, INC.

## (undated) DEVICE — COVER ARMBRD W13XL28.5IN IMPERV BLU FOR OP RM

## (undated) DEVICE — AGENT HEMSTAT 3GM PURIFIED PLNT STARCH PWD ABSRB ARISTA AH

## (undated) DEVICE — GOWN,SIRUS,NON REINFRCD,LARGE,SET IN SL: Brand: MEDLINE

## (undated) DEVICE — GOWN,SIRUS,NONRNF,SETINSLV,XL,20/CS: Brand: MEDLINE

## (undated) DEVICE — Z DISCONTINUED PER MEDLINE BANDAGE COMPR M MAMM COMFORTABLE HIGHLY ABSRB POST SURG

## (undated) DEVICE — GOLDVAC PUSH BUTTON ELECTROSURGICAL SMOKE EVACUATION HANDPIECE: Brand: GOLDVAC

## (undated) DEVICE — SPONGE DRN W4XL4IN RAYON/POLYESTER 6 PLY NONWOVEN PRECUT

## (undated) DEVICE — DRAIN SURG 15FR L3 16IN DIA47MM SIL RND HUBLESS FULL FLUT

## (undated) DEVICE — BREAST HERNIA PACK: Brand: MEDLINE INDUSTRIES, INC.

## (undated) DEVICE — DRESSING,GAUZE,XEROFORM,CURAD,5"X9",ST: Brand: CURAD

## (undated) DEVICE — SPECIMEN ORIENTATION CHARMS, SIX DISTINCTLY SHAPED STERILE 10MM CHARMS: Brand: MARGINMAP